# Patient Record
Sex: FEMALE | Race: WHITE | Employment: UNEMPLOYED | URBAN - NONMETROPOLITAN AREA
[De-identification: names, ages, dates, MRNs, and addresses within clinical notes are randomized per-mention and may not be internally consistent; named-entity substitution may affect disease eponyms.]

---

## 2019-11-14 ENCOUNTER — TELEPHONE (OUTPATIENT)
Dept: PRIMARY CARE CLINIC | Age: 59
End: 2019-11-14

## 2019-11-14 ENCOUNTER — OFFICE VISIT (OUTPATIENT)
Dept: PRIMARY CARE CLINIC | Age: 59
End: 2019-11-14
Payer: COMMERCIAL

## 2019-11-14 VITALS
DIASTOLIC BLOOD PRESSURE: 90 MMHG | HEIGHT: 67 IN | BODY MASS INDEX: 27.62 KG/M2 | TEMPERATURE: 98 F | RESPIRATION RATE: 18 BRPM | WEIGHT: 176 LBS | SYSTOLIC BLOOD PRESSURE: 154 MMHG | OXYGEN SATURATION: 97 % | HEART RATE: 97 BPM

## 2019-11-14 DIAGNOSIS — E11.65 TYPE 2 DIABETES MELLITUS WITH HYPERGLYCEMIA, WITHOUT LONG-TERM CURRENT USE OF INSULIN (HCC): ICD-10-CM

## 2019-11-14 DIAGNOSIS — E78.2 MIXED HYPERLIPIDEMIA: ICD-10-CM

## 2019-11-14 DIAGNOSIS — H10.31 ACUTE CONJUNCTIVITIS OF RIGHT EYE, UNSPECIFIED ACUTE CONJUNCTIVITIS TYPE: ICD-10-CM

## 2019-11-14 DIAGNOSIS — I10 ESSENTIAL HYPERTENSION: ICD-10-CM

## 2019-11-14 DIAGNOSIS — E03.9 ACQUIRED HYPOTHYROIDISM: ICD-10-CM

## 2019-11-14 PROCEDURE — 99213 OFFICE O/P EST LOW 20 MIN: CPT | Performed by: INTERNAL MEDICINE

## 2019-11-14 RX ORDER — PRAVASTATIN SODIUM 10 MG
10 TABLET ORAL DAILY
COMMUNITY
End: 2019-11-14 | Stop reason: SDUPTHER

## 2019-11-14 RX ORDER — LEVOTHYROXINE AND LIOTHYRONINE 57; 13.5 UG/1; UG/1
90 TABLET ORAL DAILY
COMMUNITY
End: 2019-11-14 | Stop reason: SDUPTHER

## 2019-11-14 RX ORDER — TELMISARTAN 80 MG/1
80 TABLET ORAL DAILY
Qty: 30 TABLET | Refills: 11 | Status: SHIPPED | OUTPATIENT
Start: 2019-11-14 | End: 2021-04-13 | Stop reason: SDUPTHER

## 2019-11-14 RX ORDER — PRAVASTATIN SODIUM 10 MG
10 TABLET ORAL DAILY
Qty: 30 TABLET | Refills: 11 | Status: SHIPPED | OUTPATIENT
Start: 2019-11-14 | End: 2020-01-06

## 2019-11-14 RX ORDER — METFORMIN HYDROCHLORIDE 500 MG/1
1000 TABLET, EXTENDED RELEASE ORAL 2 TIMES DAILY
COMMUNITY
End: 2019-11-14 | Stop reason: SDUPTHER

## 2019-11-14 RX ORDER — GLIMEPIRIDE 2 MG/1
2 TABLET ORAL
COMMUNITY
End: 2019-11-14 | Stop reason: SDUPTHER

## 2019-11-14 RX ORDER — TRIAMTERENE AND HYDROCHLOROTHIAZIDE 75; 50 MG/1; MG/1
1 TABLET ORAL DAILY
Qty: 30 TABLET | Refills: 11 | Status: SHIPPED | OUTPATIENT
Start: 2019-11-14 | End: 2020-11-11 | Stop reason: SDUPTHER

## 2019-11-14 RX ORDER — TRIAMTERENE AND HYDROCHLOROTHIAZIDE 75; 50 MG/1; MG/1
1 TABLET ORAL DAILY
COMMUNITY
End: 2019-11-14 | Stop reason: SDUPTHER

## 2019-11-14 RX ORDER — METOPROLOL SUCCINATE 100 MG/1
100 TABLET, EXTENDED RELEASE ORAL DAILY
Qty: 30 TABLET | Refills: 11 | Status: SHIPPED | OUTPATIENT
Start: 2019-11-14 | End: 2020-11-11 | Stop reason: SDUPTHER

## 2019-11-14 RX ORDER — LEVOTHYROXINE AND LIOTHYRONINE 57; 13.5 UG/1; UG/1
90 TABLET ORAL DAILY
Qty: 30 TABLET | Refills: 11 | Status: SHIPPED | OUTPATIENT
Start: 2019-11-14 | End: 2020-11-11 | Stop reason: SDUPTHER

## 2019-11-14 RX ORDER — TELMISARTAN 80 MG/1
80 TABLET ORAL DAILY
COMMUNITY
End: 2019-11-14 | Stop reason: SDUPTHER

## 2019-11-14 RX ORDER — ERYTHROMYCIN 5 MG/G
OINTMENT OPHTHALMIC
Qty: 1 TUBE | Refills: 0 | Status: SHIPPED | OUTPATIENT
Start: 2019-11-14 | End: 2019-11-24

## 2019-11-14 RX ORDER — METOPROLOL SUCCINATE 100 MG/1
100 TABLET, EXTENDED RELEASE ORAL DAILY
COMMUNITY
End: 2019-11-14 | Stop reason: SDUPTHER

## 2019-11-14 RX ORDER — METFORMIN HYDROCHLORIDE 500 MG/1
1000 TABLET, EXTENDED RELEASE ORAL 2 TIMES DAILY
Qty: 120 TABLET | Refills: 11 | Status: SHIPPED | OUTPATIENT
Start: 2019-11-14 | End: 2020-11-11 | Stop reason: SDUPTHER

## 2019-11-14 RX ORDER — GLIMEPIRIDE 2 MG/1
TABLET ORAL
Qty: 120 TABLET | Refills: 11 | Status: SHIPPED | OUTPATIENT
Start: 2019-11-14 | End: 2020-11-11 | Stop reason: SDUPTHER

## 2019-11-14 SDOH — HEALTH STABILITY: MENTAL HEALTH: HOW OFTEN DO YOU HAVE A DRINK CONTAINING ALCOHOL?: NEVER

## 2019-11-14 ASSESSMENT — ENCOUNTER SYMPTOMS
PHOTOPHOBIA: 0
SORE THROAT: 0
EYE ITCHING: 0
CONSTIPATION: 0
ABDOMINAL PAIN: 0
NAUSEA: 0
TROUBLE SWALLOWING: 0
WHEEZING: 0
EYE PAIN: 1
EYE DISCHARGE: 0
DIARRHEA: 0
ANAL BLEEDING: 0
SHORTNESS OF BREATH: 0
RHINORRHEA: 0
COUGH: 0
VOMITING: 0
STRIDOR: 0
COLOR CHANGE: 0
BLOOD IN STOOL: 0
FACIAL SWELLING: 0

## 2019-11-14 ASSESSMENT — VISUAL ACUITY
OS_CC: <20/200
OD_CC: 20/40

## 2019-12-03 ENCOUNTER — HOSPITAL ENCOUNTER (OUTPATIENT)
Age: 59
Discharge: HOME | End: 2019-12-05
Payer: COMMERCIAL

## 2019-12-03 DIAGNOSIS — E78.2 MIXED HYPERLIPIDEMIA: ICD-10-CM

## 2019-12-03 DIAGNOSIS — E03.9 ACQUIRED HYPOTHYROIDISM: ICD-10-CM

## 2019-12-03 DIAGNOSIS — I10 ESSENTIAL HYPERTENSION: ICD-10-CM

## 2019-12-03 DIAGNOSIS — E11.65 TYPE 2 DIABETES MELLITUS WITH HYPERGLYCEMIA, WITHOUT LONG-TERM CURRENT USE OF INSULIN (HCC): ICD-10-CM

## 2019-12-03 LAB
ALBUMIN SERPL-MCNC: 4.3 G/DL (ref 3.5–5.2)
ALP BLD-CCNC: 121 U/L (ref 35–104)
ALT SERPL-CCNC: 21 U/L (ref 0–32)
ANION GAP SERPL CALCULATED.3IONS-SCNC: 21 MMOL/L (ref 7–16)
AST SERPL-CCNC: 18 U/L (ref 0–31)
BILIRUB SERPL-MCNC: 0.6 MG/DL (ref 0–1.2)
BUN BLDV-MCNC: 17 MG/DL (ref 8–23)
CALCIUM SERPL-MCNC: 9.5 MG/DL (ref 8.6–10.2)
CHLORIDE BLD-SCNC: 98 MMOL/L (ref 98–107)
CHOLESTEROL, TOTAL: 183 MG/DL (ref 0–199)
CO2: 19 MMOL/L (ref 22–29)
CREAT SERPL-MCNC: 0.9 MG/DL (ref 0.5–1)
CREATININE URINE: 83 MG/DL (ref 29–226)
GFR AFRICAN AMERICAN: >60
GFR NON-AFRICAN AMERICAN: >60 ML/MIN/1.73
GLUCOSE BLD-MCNC: 399 MG/DL (ref 74–99)
HBA1C MFR BLD: 12.9 % (ref 4–5.6)
HDLC SERPL-MCNC: 47 MG/DL
LDL CHOLESTEROL CALCULATED: 102 MG/DL (ref 0–99)
MICROALBUMIN UR-MCNC: 121 MG/L
MICROALBUMIN/CREAT UR-RTO: 145.8 (ref 0–30)
POTASSIUM SERPL-SCNC: 4.3 MMOL/L (ref 3.5–5)
SODIUM BLD-SCNC: 138 MMOL/L (ref 132–146)
T4 FREE: 1.26 NG/DL (ref 0.93–1.7)
TOTAL PROTEIN: 7.4 G/DL (ref 6.4–8.3)
TRIGL SERPL-MCNC: 172 MG/DL (ref 0–149)
TSH SERPL DL<=0.05 MIU/L-ACNC: 1.29 UIU/ML (ref 0.27–4.2)
VLDLC SERPL CALC-MCNC: 34 MG/DL

## 2019-12-03 PROCEDURE — 80053 COMPREHEN METABOLIC PANEL: CPT

## 2019-12-03 PROCEDURE — 83036 HEMOGLOBIN GLYCOSYLATED A1C: CPT

## 2019-12-03 PROCEDURE — 84443 ASSAY THYROID STIM HORMONE: CPT

## 2019-12-03 PROCEDURE — 80061 LIPID PANEL: CPT

## 2019-12-03 PROCEDURE — 82570 ASSAY OF URINE CREATININE: CPT

## 2019-12-03 PROCEDURE — 84439 ASSAY OF FREE THYROXINE: CPT

## 2019-12-03 PROCEDURE — 36415 COLL VENOUS BLD VENIPUNCTURE: CPT

## 2019-12-03 PROCEDURE — 82044 UR ALBUMIN SEMIQUANTITATIVE: CPT

## 2020-01-06 ENCOUNTER — OFFICE VISIT (OUTPATIENT)
Dept: PRIMARY CARE CLINIC | Age: 60
End: 2020-01-06
Payer: COMMERCIAL

## 2020-01-06 ENCOUNTER — TELEPHONE (OUTPATIENT)
Dept: ADMINISTRATIVE | Age: 60
End: 2020-01-06

## 2020-01-06 VITALS
DIASTOLIC BLOOD PRESSURE: 78 MMHG | HEIGHT: 67 IN | TEMPERATURE: 98.2 F | WEIGHT: 169 LBS | OXYGEN SATURATION: 99 % | SYSTOLIC BLOOD PRESSURE: 124 MMHG | BODY MASS INDEX: 26.53 KG/M2 | HEART RATE: 83 BPM | RESPIRATION RATE: 16 BRPM

## 2020-01-06 PROBLEM — H25.89 OTHER AGE-RELATED CATARACT: Status: ACTIVE | Noted: 2020-01-06

## 2020-01-06 PROBLEM — Z01.818 ENCOUNTER FOR PRE-OPERATIVE EXAMINATION: Status: ACTIVE | Noted: 2020-01-06

## 2020-01-06 PROCEDURE — 99214 OFFICE O/P EST MOD 30 MIN: CPT | Performed by: INTERNAL MEDICINE

## 2020-01-06 RX ORDER — KETOROLAC TROMETHAMINE 5 MG/ML
SOLUTION OPHTHALMIC
COMMUNITY
Start: 2019-12-11 | End: 2020-11-12 | Stop reason: ALTCHOICE

## 2020-01-06 RX ORDER — PRAVASTATIN SODIUM 40 MG
40 TABLET ORAL DAILY
Qty: 90 TABLET | Refills: 1 | Status: SHIPPED
Start: 2020-01-06 | End: 2020-11-11 | Stop reason: SDUPTHER

## 2020-01-06 RX ORDER — BESIFLOXACIN 6 MG/ML
SUSPENSION OPHTHALMIC
COMMUNITY
Start: 2019-12-11 | End: 2022-07-18 | Stop reason: ALTCHOICE

## 2020-01-06 RX ORDER — PREDNISOLONE ACETATE 10 MG/ML
SUSPENSION/ DROPS OPHTHALMIC
COMMUNITY
Start: 2019-12-11 | End: 2020-11-12 | Stop reason: ALTCHOICE

## 2020-01-06 RX ORDER — IBUPROFEN 800 MG/1
TABLET ORAL
COMMUNITY
Start: 2019-12-02

## 2020-01-06 ASSESSMENT — ENCOUNTER SYMPTOMS
FACIAL SWELLING: 0
STRIDOR: 0
WHEEZING: 0
BLOOD IN STOOL: 0
COUGH: 0
DIARRHEA: 0
RHINORRHEA: 0
ANAL BLEEDING: 0
PHOTOPHOBIA: 0
NAUSEA: 0
ABDOMINAL PAIN: 0
SORE THROAT: 0
TROUBLE SWALLOWING: 0
EYE PAIN: 0
COLOR CHANGE: 0
EYE ITCHING: 0
VOMITING: 0
CONSTIPATION: 0
SHORTNESS OF BREATH: 0
EYE DISCHARGE: 0

## 2020-01-06 NOTE — ASSESSMENT & PLAN NOTE
Blood pressures are stable. Continue medications and monitor blood pressures at home. Call office if systolics are over 704 over diastolics over 90.

## 2020-01-06 NOTE — ASSESSMENT & PLAN NOTE
Increase pravastatin to 40 mg at bedtime in view of increased cardiovascular risk.   Will recheck lipids on next office visit

## 2020-01-06 NOTE — PROGRESS NOTES
2020    Name: Ayden Felder : 1960 Sex: female  Age: 62 y.o. Subjective:  Chief Complaint   Patient presents with    Pre-op Exam     Patient is having cataract removal by Dr Sebastián Escobar on 1/10/20. KWABENA Puentes is here for preoperative clearance for cataract surgery on her right eye. Initially thought she had conjunctivitis and was sent to Dr. Sebastián Escobar who found her to have a fast-growing cataract on the right. He put her on eyedrops. She is scheduled for surgery in January 10, 2020. Karol Puentes has a history of poorly controlled type 2 diabetes mellitus not on insulin. Her last hemoglobin A1c was 12.9%. I found out she was not taking her Bydureon shots. She promises to get them and take them properly. In the meantime she stays on her metformin thousand milligrams twice daily and Amaryl 2 mg twice daily. She is very reluctant to take insulin as she has a sister who is taking insulin. She promises to watch her blood sugars more carefully. Last time she checked it today it was over 200. She is also losing weight. This could be secondary to her uncontrolled blood sugars. She has lost over 100 pounds in the last year or 2. She has a history of hypothyroidism on levothyroxine. TSH and free T4 within normal limits in 2019    Her total cholesterol was not bad however LDL cholesterol slightly elevated at 102. Triglycerides slightly elevated at 177. Hemoglobin A1c is noted was 12.9%. Fasting blood sugar was 399. Alkaline phosphatase was 121. Colonoscopy was scheduled last year however the endoscopist was ill so she has not made an appointment for follow-up. I recommend that she call his office and reschedule her colonoscopy. She refuses flu shot. She has had a Pneumovax. Discussed her increased risk for cardiovascular events in view of her morbidities. She is on low-dose pravastatin 10 mg, will increase it as recommended to 40 mg at bedtime.     She has not had a Pap test in (ACULAR) 0.5 % ophthalmic solution, PLACE 1 DROP IN OPERATIVE EYE 4 TIMES DAILY BEGINNING 2 DAYS PRIOR TO SURGERY, Disp: , Rfl:     prednisoLONE acetate (PRED FORTE) 1 % ophthalmic suspension, PLACE 1 DROP IN OPERATIVE EYE 4 TIMES A DAY START AFTER SURGERY, Disp: , Rfl:     ibuprofen (ADVIL;MOTRIN) 800 MG tablet, TAKE ONE TABLET BY MOUTH EVERY 4 HOURS AS NEEDED, Disp: , Rfl:     pravastatin (PRAVACHOL) 40 MG tablet, Take 1 tablet by mouth daily, Disp: 90 tablet, Rfl: 1    exenatide (BYDUREON) 2 MG SRER injection, Inject 1 Syringe into the skin once a week, Disp: 4 each, Rfl: 5    metoprolol succinate (TOPROL XL) 100 MG extended release tablet, Take 1 tablet by mouth daily, Disp: 30 tablet, Rfl: 11    metFORMIN (GLUCOPHAGE-XR) 500 MG extended release tablet, Take 2 tablets by mouth 2 times daily, Disp: 120 tablet, Rfl: 11    blood glucose test strips (ASCENSIA AUTODISC VI;ONE TOUCH ULTRA TEST VI) strip, 1 each by In Vitro route 2 times daily One touch ultra blue., Disp: 200 each, Rfl: 5    glimepiride (AMARYL) 2 MG tablet, 1 tab in AM, 3 tabs in PM, Disp: 120 tablet, Rfl: 11    triamterene-hydrochlorothiazide (MAXZIDE) 75-50 MG per tablet, Take 1 tablet by mouth daily, Disp: 30 tablet, Rfl: 11    telmisartan (MICARDIS) 80 MG tablet, Take 1 tablet by mouth daily, Disp: 30 tablet, Rfl: 11    thyroid (ARMOUR THYROID) 90 MG tablet, Take 1 tablet by mouth daily, Disp: 30 tablet, Rfl: 11     No Known Allergies     Past Medical History:   Diagnosis Date    Acquired hypothyroidism 11/14/2019    Essential hypertension 11/14/2019    Mixed hyperlipidemia 11/14/2019    Type 2 diabetes mellitus with hyperglycemia, without long-term current use of insulin [facility] 11/14/2019       Health Maintenance Due   Topic Date Due    Hepatitis C screen  1960    Diabetic foot exam  05/02/1968    HIV screen  05/02/1973    Cervical cancer screen  05/02/1979    Shingles Vaccine (1 of 2) 05/02/2008    Colon cancer screen Pulmonary:      Effort: Pulmonary effort is normal. No respiratory distress. Breath sounds: Normal breath sounds. No wheezing or rales. Chest:      Chest wall: No tenderness. Abdominal:      General: Bowel sounds are normal. There is no distension. Palpations: Abdomen is soft. There is no mass. Tenderness: There is no tenderness. There is no guarding or rebound. Musculoskeletal: Normal range of motion. General: No tenderness or deformity. Lymphadenopathy:      Cervical: No cervical adenopathy. Skin:     General: Skin is warm and dry. Coloration: Skin is not pale. Findings: No erythema or rash. Neurological:      General: No focal deficit present. Mental Status: She is alert and oriented to person, place, and time. Cranial Nerves: No cranial nerve deficit. Sensory: No sensory deficit. Deep Tendon Reflexes: Reflexes normal.   Psychiatric:         Mood and Affect: Mood normal.         Behavior: Behavior normal.         Thought Content: Thought content normal.         Judgment: Judgment normal.          Last labs reviewed. ASSESSMENT & PLAN :   Problem List        Circulatory    Essential hypertension     Blood pressures are stable. Continue medications and monitor blood pressures at home. Call office if systolics are over 537 over diastolics over 90. Relevant Medications    metoprolol succinate (TOPROL XL) 100 MG extended release tablet    triamterene-hydrochlorothiazide (MAXZIDE) 75-50 MG per tablet    telmisartan (MICARDIS) 80 MG tablet    pravastatin (PRAVACHOL) 40 MG tablet       Endocrine    Type 2 diabetes mellitus with hyperglycemia, without long-term current use of insulin ([facility])     Blood pressure is very poorly controlled. She promises to take her Bydureon shots weekly on a regular basis and continue her other medications. If her hemoglobin A1c has not improved on her next office visit I would strongly recommend adding insulin.

## 2020-01-06 NOTE — ASSESSMENT & PLAN NOTE
Blood pressure is very poorly controlled. She promises to take her Bydureon shots weekly on a regular basis and continue her other medications. If her hemoglobin A1c has not improved on her next office visit I would strongly recommend adding insulin. She saw Dr. Torsten Cobb and will not  go back to see him. Dr. Angie Vasquez is not on her insurance list.  I asked her to check her insurance list for other endocrinologist in the area. She was also asked to watch more closely the carbs and sweets in her diet.

## 2020-06-01 ENCOUNTER — TELEPHONE (OUTPATIENT)
Dept: PRIMARY CARE CLINIC | Age: 60
End: 2020-06-01

## 2020-06-03 ENCOUNTER — TELEPHONE (OUTPATIENT)
Dept: PRIMARY CARE CLINIC | Age: 60
End: 2020-06-03

## 2020-11-11 ENCOUNTER — VIRTUAL VISIT (OUTPATIENT)
Dept: PRIMARY CARE CLINIC | Age: 60
End: 2020-11-11
Payer: COMMERCIAL

## 2020-11-11 PROCEDURE — 99443 PR PHYS/QHP TELEPHONE EVALUATION 21-30 MIN: CPT | Performed by: INTERNAL MEDICINE

## 2020-11-11 RX ORDER — GLIMEPIRIDE 2 MG/1
TABLET ORAL
Qty: 120 TABLET | Refills: 11 | Status: SHIPPED
Start: 2020-11-11 | End: 2021-12-13 | Stop reason: SDUPTHER

## 2020-11-11 RX ORDER — METFORMIN HYDROCHLORIDE 500 MG/1
1000 TABLET, EXTENDED RELEASE ORAL 2 TIMES DAILY
Qty: 120 TABLET | Refills: 11 | Status: SHIPPED
Start: 2020-11-11 | End: 2020-11-12 | Stop reason: ALTCHOICE

## 2020-11-11 RX ORDER — PRAVASTATIN SODIUM 40 MG
40 TABLET ORAL DAILY
Qty: 90 TABLET | Refills: 1 | Status: SHIPPED
Start: 2020-11-11 | End: 2020-11-12

## 2020-11-11 RX ORDER — METOPROLOL SUCCINATE 100 MG/1
100 TABLET, EXTENDED RELEASE ORAL DAILY
Qty: 30 TABLET | Refills: 11 | Status: SHIPPED
Start: 2020-11-11 | End: 2021-12-13 | Stop reason: SDUPTHER

## 2020-11-11 RX ORDER — LEVOTHYROXINE AND LIOTHYRONINE 57; 13.5 UG/1; UG/1
90 TABLET ORAL DAILY
Qty: 30 TABLET | Refills: 11 | Status: SHIPPED
Start: 2020-11-11 | End: 2021-12-13 | Stop reason: SDUPTHER

## 2020-11-11 RX ORDER — TRIAMTERENE AND HYDROCHLOROTHIAZIDE 75; 50 MG/1; MG/1
1 TABLET ORAL DAILY
Qty: 30 TABLET | Refills: 11 | Status: SHIPPED
Start: 2020-11-11 | End: 2022-01-03 | Stop reason: SDUPTHER

## 2020-11-11 ASSESSMENT — PATIENT HEALTH QUESTIONNAIRE - PHQ9
SUM OF ALL RESPONSES TO PHQ QUESTIONS 1-9: 0
2. FEELING DOWN, DEPRESSED OR HOPELESS: 0
SUM OF ALL RESPONSES TO PHQ QUESTIONS 1-9: 0
SUM OF ALL RESPONSES TO PHQ QUESTIONS 1-9: 0
1. LITTLE INTEREST OR PLEASURE IN DOING THINGS: 0
SUM OF ALL RESPONSES TO PHQ9 QUESTIONS 1 & 2: 0

## 2020-11-12 PROBLEM — H25.89 OTHER AGE-RELATED CATARACT: Status: RESOLVED | Noted: 2020-01-06 | Resolved: 2020-11-12

## 2020-11-12 PROBLEM — H10.31 ACUTE CONJUNCTIVITIS OF RIGHT EYE: Status: RESOLVED | Noted: 2019-11-14 | Resolved: 2020-11-12

## 2020-11-12 PROBLEM — E78.2 MIXED HYPERLIPIDEMIA: Status: RESOLVED | Noted: 2019-11-14 | Resolved: 2020-11-12

## 2020-11-12 PROBLEM — Z01.818 ENCOUNTER FOR PRE-OPERATIVE EXAMINATION: Status: RESOLVED | Noted: 2020-01-06 | Resolved: 2020-11-12

## 2020-11-12 RX ORDER — METFORMIN HYDROCHLORIDE EXTENDED-RELEASE TABLETS 1000 MG/1
TABLET, FILM COATED, EXTENDED RELEASE ORAL
COMMUNITY
Start: 2020-01-08 | End: 2020-11-12

## 2020-11-12 RX ORDER — PRAVASTATIN SODIUM 40 MG
20 TABLET ORAL DAILY
Qty: 45 TABLET | Refills: 1 | Status: SHIPPED
Start: 2020-11-12 | End: 2020-11-12 | Stop reason: ALTCHOICE

## 2020-11-12 RX ORDER — PRAVASTATIN SODIUM 40 MG
20 TABLET ORAL DAILY
Qty: 45 TABLET | Refills: 1 | Status: SHIPPED
Start: 2020-11-12 | End: 2021-04-13 | Stop reason: SDUPTHER

## 2020-11-12 ASSESSMENT — ENCOUNTER SYMPTOMS
COLOR CHANGE: 0
EYE PAIN: 0
WHEEZING: 0
ANAL BLEEDING: 0
DIARRHEA: 0
VOMITING: 0
STRIDOR: 0
RHINORRHEA: 0
CONSTIPATION: 0
SHORTNESS OF BREATH: 0
COUGH: 0
TROUBLE SWALLOWING: 0
SORE THROAT: 0
EYE ITCHING: 0
NAUSEA: 0
ABDOMINAL PAIN: 0
PHOTOPHOBIA: 0
EYE DISCHARGE: 0
FACIAL SWELLING: 0
BLOOD IN STOOL: 0

## 2020-11-12 NOTE — PROGRESS NOTES
Trula Sandifer is a 56 y.o. female evaluated via telephone on 2020. Consent:  She and/or health care decision maker is aware that that she may receive a bill for this telephone service, depending on her insurance coverage, and has provided verbal consent to proceed: Yes      Documentation:  I communicated with the patient and/or health care decision maker about diabetes. Details of this discussion including any medical advice provided: see note below    Patient was located at her [facility] home address, PCP located at other [facility] address. No one else was involved in this call      I affirm this is a Patient Initiated Episode with a Patient who has not had a related appointment within my department in the past 7 days or scheduled within the next 24 hours. Patient identification was verified at the start of the visit: Yes    Total Time: minutes: 21-30 minutes    Note: not billable if this call serves to triage the patient into an appointment for the relevant concern      Rebecca Stark     2020    Name: Trula Sandifer : 1960 Sex: female  Age: 56 y.o. Subjective:  Chief Complaint   Patient presents with    Medication Refill        HPI       Chavo Washington has a history of poorly controlled type 2 diabetes mellitus not on insulin. Her last hemoglobin A1c was 12.9%. I found out she was not taking her Bydureon shots. She promises to get them and take them properly. In the meantime she stays on her metformin thousand milligrams twice daily and Amaryl 2 mg twice daily. She is very reluctant to take insulin as she has a sister who is taking insulin. She promises to watch her blood sugars more carefully. Last time she checked it today it was over 200. She is also losing weight. This could be secondary to her uncontrolled blood sugars. She has lost over 100 pounds in the last year or 2. Her blood sugars are still markedly elevated.   She used to go to Dr. Gt Dillard as an endocrinologist but her noted was 12.9%. Review of Systems   Constitutional: Negative for appetite change, fatigue and unexpected weight change. HENT: Negative for congestion, ear pain, facial swelling, rhinorrhea, sore throat, tinnitus and trouble swallowing. Eyes: Negative for photophobia, pain, discharge, itching and visual disturbance. Respiratory: Negative for cough, shortness of breath, wheezing and stridor. Cardiovascular: Negative for chest pain, palpitations and leg swelling. Gastrointestinal: Negative for abdominal pain, anal bleeding, blood in stool, constipation, diarrhea, nausea and vomiting. Endocrine: Negative for cold intolerance, heat intolerance, polydipsia, polyphagia and polyuria. Genitourinary: Negative for difficulty urinating, dysuria, flank pain, frequency, hematuria and urgency. Musculoskeletal: Positive for arthralgias. Negative for gait problem, joint swelling and myalgias. Right shoulder   Skin: Negative for color change, pallor and rash. Allergic/Immunologic: Negative for environmental allergies and food allergies. Neurological: Negative for dizziness, tremors, seizures, syncope, speech difficulty, weakness, light-headedness, numbness and headaches. Hematological: Negative for adenopathy. Does not bruise/bleed easily. Psychiatric/Behavioral: Negative for agitation, behavioral problems, confusion, sleep disturbance and suicidal ideas. The patient is not nervous/anxious.            Current Outpatient Medications:     pravastatin (PRAVACHOL) 40 MG tablet, Take 0.5 tablets by mouth daily, Disp: 45 tablet, Rfl: 1    metFORMIN (GLUCOPHAGE) 1000 MG tablet, Take 1 tablet by mouth 2 times daily (with meals), Disp: 180 tablet, Rfl: 1    exenatide (BYDUREON) 2 MG SRER injection, Inject 1 Syringe into the skin once a week, Disp: 4 each, Rfl: 5    glimepiride (AMARYL) 2 MG tablet, 1 tab in AM, 3 tabs in PM, Disp: 120 tablet, Rfl: 11    thyroid (ARMOUR THYROID) 90 MG tablet, Take 1 tablet by mouth daily, Disp: 30 tablet, Rfl: 11    metoprolol succinate (TOPROL XL) 100 MG extended release tablet, Take 1 tablet by mouth daily, Disp: 30 tablet, Rfl: 11    triamterene-hydroCHLOROthiazide (MAXZIDE) 75-50 MG per tablet, Take 1 tablet by mouth daily, Disp: 30 tablet, Rfl: 11    BESIVANCE 0.6 % SUSP, PLACE 1 DROP IN OPERATIVE EYE 3 TIMES DAILY BEGINNING 2 DAYS PRIOR TO SURGERY, Disp: , Rfl:     ibuprofen (ADVIL;MOTRIN) 800 MG tablet, TAKE ONE TABLET BY MOUTH EVERY 4 HOURS AS NEEDED, Disp: , Rfl:     blood glucose test strips (ASCENSIA AUTODISC VI;ONE TOUCH ULTRA TEST VI) strip, 1 each by In Vitro route 2 times daily One touch ultra blue., Disp: 200 each, Rfl: 5    telmisartan (MICARDIS) 80 MG tablet, Take 1 tablet by mouth daily, Disp: 30 tablet, Rfl: 11     No Known Allergies     Past Medical History:   Diagnosis Date    Acquired hypothyroidism 2019    Essential hypertension 2019    Mixed hyperlipidemia 2019    Type 2 diabetes mellitus with hyperglycemia, without long-term current use of insulin ([address] 2019       Health Maintenance Due   Topic Date Due    Hepatitis C screen  1960    Diabetic foot exam  1968    HIV screen  1973    Cervical cancer screen  1979    Shingles Vaccine (1 of 2) 2008    Colon cancer screen colonoscopy  2018    A1C test (Diabetic or Prediabetic)  2020    DTaP/Tdap/Td vaccine (2 - Td) 2020    Diabetic retinal exam  2020        Patient Active Problem List   Diagnosis    Type 2 diabetes mellitus with hyperglycemia, without long-term current use of insulin (HCC)    Essential hypertension    Acquired hypothyroidism        Past Surgical History:   Procedure Laterality Date    APPENDECTOMY       SECTION      X3    CHOLECYSTECTOMY      COCCYX REMOVAL      WISDOM TOOTH EXTRACTION          Family History   Problem Relation Age of Onset    Heart Disease Father         Social History     Tobacco Use    Smoking status: Never Smoker    Smokeless tobacco: Never Used   Substance Use Topics    Alcohol use: Never     Frequency: Never    Drug use: Never        Objective  There were no vitals filed for this visit. Exam:       Last labs reviewed. ASSESSMENT & PLAN :   Problem List        Circulatory    Essential hypertension     Blood pressures are stable. Continue medications and monitor blood pressures at home. Call office if systolics are over 763 over diastolics over 90. Relevant Medications    telmisartan (MICARDIS) 80 MG tablet    metoprolol succinate (TOPROL XL) 100 MG extended release tablet    triamterene-hydroCHLOROthiazide (MAXZIDE) 75-50 MG per tablet    Other Relevant Orders    Comprehensive Metabolic Panel       Endocrine    Type 2 diabetes mellitus with hyperglycemia, without long-term current use of insulin ([address] - Primary     Again had a long discussion about the need for her to watch her diet and monitor blood sugars and let me know very high so we can adjust medications. She is very reluctant to go on insulin. She is already on Bydureon, Metformin and glimepiride. We can add Jardiance if her hemoglobin A1c is still extremely high. I would really like her to follow-up with endocrinology but she does not have one in the area that she wants to go to. Relevant Medications    blood glucose test strips (ASCENSIA AUTODISC VI;ONE TOUCH ULTRA TEST VI) strip    exenatide (BYDUREON) 2 MG SRER injection    glimepiride (AMARYL) 2 MG tablet    metFORMIN (GLUCOPHAGE) 1000 MG tablet    Other Relevant Orders    Comprehensive Metabolic Panel    Hemoglobin A1C    Microalbumin / Creatinine Urine Ratio    Acquired hypothyroidism     Continue levothyroxine.   Check TSH and free T4         Relevant Medications    thyroid (ARMOUR THYROID) 90 MG tablet    Other Relevant Orders    T4, Free    TSH without Reflex       Other    RESOLVED: Mixed hyperlipidemia    Relevant Medications    telmisartan (MICARDIS) 80 MG tablet    metoprolol succinate (TOPROL XL) 100 MG extended release tablet    triamterene-hydroCHLOROthiazide (MAXZIDE) 75-50 MG per tablet    pravastatin (PRAVACHOL) 40 MG tablet    Other Relevant Orders    Lipid Panel           Return in about 3 months (around 2/11/2021), or Diabetes mellitus and hypertension.        Bob Saeed DO  11/12/2020

## 2020-11-12 NOTE — ASSESSMENT & PLAN NOTE
Again had a long discussion about the need for her to watch her diet and monitor blood sugars and let me know very high so we can adjust medications. She is very reluctant to go on insulin. She is already on Bydureon, Metformin and glimepiride. We can add Jardiance if her hemoglobin A1c is still extremely high. I would really like her to follow-up with endocrinology but she does not have one in the area that she wants to go to.

## 2020-11-12 NOTE — ASSESSMENT & PLAN NOTE
Blood pressures are stable. Continue medications and monitor blood pressures at home. Call office if systolics are over 299 over diastolics over 90.

## 2021-04-08 ENCOUNTER — TELEPHONE (OUTPATIENT)
Dept: PRIMARY CARE CLINIC | Age: 61
End: 2021-04-08

## 2021-04-08 RX ORDER — EXENATIDE 2 MG/.65ML
2 INJECTION, SUSPENSION, EXTENDED RELEASE SUBCUTANEOUS WEEKLY
Qty: 4 PEN | Refills: 0 | Status: SHIPPED
Start: 2021-04-08 | End: 2021-04-13 | Stop reason: SDUPTHER

## 2021-04-08 NOTE — TELEPHONE ENCOUNTER
Needs new script for a new pen called B cise the other is no longer made please send to [facility] in [place] thank you

## 2021-04-13 ENCOUNTER — OFFICE VISIT (OUTPATIENT)
Dept: PRIMARY CARE CLINIC | Age: 61
End: 2021-04-13
Payer: COMMERCIAL

## 2021-04-13 VITALS
WEIGHT: 192 LBS | HEART RATE: 78 BPM | HEIGHT: 66 IN | DIASTOLIC BLOOD PRESSURE: 70 MMHG | OXYGEN SATURATION: 97 % | BODY MASS INDEX: 30.86 KG/M2 | TEMPERATURE: 97.2 F | SYSTOLIC BLOOD PRESSURE: 120 MMHG

## 2021-04-13 DIAGNOSIS — E03.9 ACQUIRED HYPOTHYROIDISM: ICD-10-CM

## 2021-04-13 DIAGNOSIS — R10.11 RIGHT UPPER QUADRANT ABDOMINAL PAIN: ICD-10-CM

## 2021-04-13 DIAGNOSIS — E04.2 MULTINODULAR GOITER: ICD-10-CM

## 2021-04-13 DIAGNOSIS — I10 ESSENTIAL HYPERTENSION: ICD-10-CM

## 2021-04-13 DIAGNOSIS — E78.2 MIXED HYPERLIPIDEMIA: ICD-10-CM

## 2021-04-13 DIAGNOSIS — Z12.31 ENCOUNTER FOR SCREENING MAMMOGRAM FOR MALIGNANT NEOPLASM OF BREAST: ICD-10-CM

## 2021-04-13 DIAGNOSIS — E11.65 TYPE 2 DIABETES MELLITUS WITH HYPERGLYCEMIA, WITHOUT LONG-TERM CURRENT USE OF INSULIN (HCC): Primary | ICD-10-CM

## 2021-04-13 PROBLEM — E04.9 GOITER: Status: ACTIVE | Noted: 2021-04-13

## 2021-04-13 PROCEDURE — 99214 OFFICE O/P EST MOD 30 MIN: CPT | Performed by: INTERNAL MEDICINE

## 2021-04-13 RX ORDER — PRAVASTATIN SODIUM 40 MG
20 TABLET ORAL DAILY
Qty: 90 TABLET | Refills: 0 | Status: SHIPPED
Start: 2021-04-13 | End: 2021-08-10

## 2021-04-13 RX ORDER — TELMISARTAN 80 MG/1
80 TABLET ORAL DAILY
Qty: 30 TABLET | Refills: 11 | Status: SHIPPED
Start: 2021-04-13 | End: 2022-01-03 | Stop reason: SDUPTHER

## 2021-04-13 RX ORDER — EXENATIDE 2 MG/.65ML
2 INJECTION, SUSPENSION, EXTENDED RELEASE SUBCUTANEOUS WEEKLY
Qty: 4 PEN | Refills: 0 | Status: SHIPPED
Start: 2021-04-13 | End: 2021-07-06

## 2021-04-13 ASSESSMENT — PATIENT HEALTH QUESTIONNAIRE - PHQ9
SUM OF ALL RESPONSES TO PHQ QUESTIONS 1-9: 0
SUM OF ALL RESPONSES TO PHQ QUESTIONS 1-9: 0
2. FEELING DOWN, DEPRESSED OR HOPELESS: 0
1. LITTLE INTEREST OR PLEASURE IN DOING THINGS: 0

## 2021-04-13 ASSESSMENT — ENCOUNTER SYMPTOMS
EYE PAIN: 0
FACIAL SWELLING: 0
COLOR CHANGE: 0
DIARRHEA: 0
NAUSEA: 0
BLOOD IN STOOL: 0
SORE THROAT: 0
COUGH: 0
STRIDOR: 0
PHOTOPHOBIA: 0
EYE DISCHARGE: 0
EYE ITCHING: 0
TROUBLE SWALLOWING: 0
ABDOMINAL PAIN: 1
SHORTNESS OF BREATH: 0
WHEEZING: 0
ABDOMINAL DISTENTION: 1
ANAL BLEEDING: 0
VOMITING: 0
RHINORRHEA: 0
CONSTIPATION: 0

## 2021-04-13 NOTE — ASSESSMENT & PLAN NOTE
Unclear control, continue current plan pending work up below   Check hemoglobin A1c and urine microalbumin/creatinine ratio. Adjust medications depending upon her hemoglobin A1c and fasting blood sugar.

## 2021-04-13 NOTE — ASSESSMENT & PLAN NOTE
Prior authorization started for [place] Thyroid. She was then 90 mg until I get her TSH and free T4 back. Which time we may need to adjust her dose of [place] Thyroid.

## 2021-04-13 NOTE — PROGRESS NOTES
diarrhea. She had her Covid vaccines in March 2021. Review of Systems   Constitutional: Negative for appetite change, fatigue and unexpected weight change. HENT: Negative for congestion, ear pain, facial swelling, rhinorrhea, sore throat, tinnitus and trouble swallowing. Eyes: Negative for photophobia, pain, discharge, itching and visual disturbance. Respiratory: Negative for cough, shortness of breath, wheezing and stridor. Cardiovascular: Negative for chest pain, palpitations and leg swelling. Gastrointestinal: Positive for abdominal distention and abdominal pain. Negative for anal bleeding, blood in stool, constipation, diarrhea, nausea and vomiting. Endocrine: Negative for cold intolerance, heat intolerance, polydipsia, polyphagia and polyuria. Genitourinary: Negative for difficulty urinating, dysuria, flank pain, frequency, hematuria and urgency. Musculoskeletal: Negative for arthralgias, gait problem, joint swelling and myalgias. Skin: Negative for color change, pallor and rash. Allergic/Immunologic: Negative for environmental allergies and food allergies. Neurological: Negative for dizziness, tremors, seizures, syncope, speech difficulty, weakness, light-headedness, numbness and headaches. Hematological: Negative for adenopathy. Does not bruise/bleed easily. Psychiatric/Behavioral: Negative for agitation, behavioral problems, confusion, sleep disturbance and suicidal ideas. The patient is not nervous/anxious.            Current Outpatient Medications:     telmisartan (MICARDIS) 80 MG tablet, Take 1 tablet by mouth daily, Disp: 30 tablet, Rfl: 11    Exenatide (BYDUREON) 2 MG PEN, Inject 1 pen into the skin once a week, Disp: 4 pen, Rfl: 0    pravastatin (PRAVACHOL) 40 MG tablet, Take 0.5 tablets by mouth daily, Disp: 90 tablet, Rfl: 0    metFORMIN (GLUCOPHAGE) 1000 MG tablet, Take 1 tablet by mouth 2 times daily (with meals), Disp: 180 tablet, Rfl: 1    glimepiride (AMARYL) 2 MG tablet, 1 tab in AM, 3 tabs in PM, Disp: 120 tablet, Rfl: 11    thyroid (ARMOUR THYROID) 90 MG tablet, Take 1 tablet by mouth daily, Disp: 30 tablet, Rfl: 11    metoprolol succinate (TOPROL XL) 100 MG extended release tablet, Take 1 tablet by mouth daily, Disp: 30 tablet, Rfl: 11    triamterene-hydroCHLOROthiazide (MAXZIDE) 75-50 MG per tablet, Take 1 tablet by mouth daily, Disp: 30 tablet, Rfl: 11    BESIVANCE 0.6 % SUSP, PLACE 1 DROP IN OPERATIVE EYE 3 TIMES DAILY BEGINNING 2 DAYS PRIOR TO SURGERY, Disp: , Rfl:     ibuprofen (ADVIL;MOTRIN) 800 MG tablet, TAKE ONE TABLET BY MOUTH EVERY 4 HOURS AS NEEDED, Disp: , Rfl:     blood glucose test strips (ASCENSIA AUTODISC VI;ONE TOUCH ULTRA TEST VI) strip, 1 each by In Vitro route 2 times daily One touch ultra blue., Disp: 200 each, Rfl: 5     No Known Allergies     Past Medical History:   Diagnosis Date    Acquired hypothyroidism 11/14/2019    Essential hypertension 11/14/2019    Mixed hyperlipidemia 11/14/2019    Type 2 diabetes mellitus with hyperglycemia, without long-term current use of insulin ([facility] [address] 11/14/2019       Health Maintenance Due   Topic Date Due    Hepatitis C screen  Never done    Diabetic foot exam  Never done    HIV screen  Never done    Cervical cancer screen  Never done    Shingles Vaccine (1 of 2) Never done    Colon cancer screen colonoscopy  11/04/2018    DTaP/Tdap/Td vaccine (2 - Td) 11/09/2020    Diabetic retinal exam  11/14/2020    A1C test (Diabetic or Prediabetic)  12/03/2020    Diabetic microalbuminuria test  12/03/2020    Lipid screen  12/03/2020    TSH testing  12/03/2020    Potassium monitoring  12/03/2020    Creatinine monitoring  12/03/2020        Patient Active Problem List   Diagnosis    Type 2 diabetes mellitus with hyperglycemia, without long-term current use of insulin (HCC)    Essential hypertension    Acquired hypothyroidism    Mixed hyperlipidemia    Right upper quadrant abdominal pain    Goiter  Encounter for screening mammogram for malignant neoplasm of breast    Multinodular goiter        Past Surgical History:   Procedure Laterality Date    APPENDECTOMY       SECTION      X3    CHOLECYSTECTOMY      COCCYX REMOVAL      WISDOM TOOTH EXTRACTION          Family History   Problem Relation Age of Onset    Heart Disease Father         Social History     Tobacco Use    Smoking status: Never Smoker    Smokeless tobacco: Never Used   Substance Use Topics    Alcohol use: Never     Frequency: Never    Drug use: Never        Objective  Vitals:    21 1052   BP: 120/70   Site: Right Upper Arm   Position: Sitting   Cuff Size: Medium Adult   Pulse: 78   Temp: 97.2 °F (36.2 °C)   TempSrc: Temporal   SpO2: 97%   Weight: 192 lb (87.1 kg)   Height: 5' 6\" (1.676 m)        Exam:  Physical Exam  Vitals signs reviewed. Constitutional:       General: She is not in acute distress. Appearance: She is well-developed. She is obese. She is not ill-appearing. HENT:      Head: Normocephalic. Right Ear: External ear normal.      Left Ear: External ear normal.   Eyes:      General: No scleral icterus. Right eye: No discharge. Left eye: No discharge. Conjunctiva/sclera: Conjunctivae normal.      Pupils: Pupils are equal, round, and reactive to light. Neck:      Musculoskeletal: Normal range of motion and neck supple. Thyroid: No thyromegaly. Comments: Thyroid slightly enlarged and nodular  Cardiovascular:      Rate and Rhythm: Normal rate and regular rhythm. Heart sounds: Normal heart sounds. No murmur. No friction rub. No gallop. Pulmonary:      Effort: Pulmonary effort is normal. No respiratory distress. Breath sounds: Normal breath sounds. No wheezing or rales. Chest:      Chest wall: No tenderness. Abdominal:      General: Bowel sounds are normal. There is no distension. Palpations: Abdomen is soft. Tenderness:  There is no guarding or rebound. Comments: 1+ tender to palpation right upper quadrant. I cannot palpate gallbladder. Liver appears slightly enlarged. Musculoskeletal: Normal range of motion. General: No tenderness or deformity. Lymphadenopathy:      Cervical: No cervical adenopathy. Skin:     General: Skin is warm and dry. Coloration: Skin is not pale. Findings: No erythema or rash. Neurological:      Mental Status: She is alert and oriented to person, place, and time. Cranial Nerves: No cranial nerve deficit. Sensory: No sensory deficit. Deep Tendon Reflexes: Reflexes normal.   Psychiatric:         Mood and Affect: Mood normal.         Behavior: Behavior normal.         Thought Content: Thought content normal.         Judgment: Judgment normal.          Last labs reviewed. ASSESSMENT & PLAN :   Problem List        Circulatory    Essential hypertension      Well-controlled, continue current treatment plan ,have fasting CMP done at facility of choice         Relevant Medications    metoprolol succinate (TOPROL XL) 100 MG extended release tablet    triamterene-hydroCHLOROthiazide (MAXZIDE) 75-50 MG per tablet    telmisartan (MICARDIS) 80 MG tablet       Endocrine    Type 2 diabetes mellitus with hyperglycemia, without long-term current use of insulin (HCC) - Primary      Unclear control, continue current plan pending work up below   Check hemoglobin A1c and urine microalbumin/creatinine ratio. Adjust medications depending upon her hemoglobin A1c and fasting blood sugar. Relevant Medications    blood glucose test strips (ASCENSIA AUTODISC VI;ONE TOUCH ULTRA TEST VI) strip    glimepiride (AMARYL) 2 MG tablet    metFORMIN (GLUCOPHAGE) 1000 MG tablet    Exenatide (BYDUREON) 2 MG PEN    Other Relevant Orders    DME Order for Diabetic Testing Supplies as OP    Acquired hypothyroidism     Prior authorization started for [place] Thyroid.   She was then 90 mg until I get her TSH and free T4 back.  Which time we may need to adjust her dose of [place] Thyroid. Relevant Medications    thyroid (ARMOUR THYROID) 90 MG tablet    Multinodular goiter      Ultrasound of thyroid ordered. We will continue to monitor for increase in size of nodules         Relevant Medications    thyroid (ARMOUR THYROID) 90 MG tablet       Other    Mixed hyperlipidemia      Unclear control, continue current plan pending work up below   Check fasting lipid profile and adjust pravastatin dose pending results. Continue watching diet         Relevant Medications    metoprolol succinate (TOPROL XL) 100 MG extended release tablet    triamterene-hydroCHLOROthiazide (MAXZIDE) 75-50 MG per tablet    telmisartan (MICARDIS) 80 MG tablet    pravastatin (PRAVACHOL) 40 MG tablet    Right upper quadrant abdominal pain      This may be from her gallbladder or from her liver. We will check an ultrasound of the gallbladder first and further imaging or decisions will be pending those results. Relevant Orders    US GALLBLADDER RUQ    Encounter for screening mammogram for malignant neoplasm of breast       requisition for screening mammogram given to patient and she will schedule this hersel         Relevant Orders    [facility] DIGITAL SCREEN W OR WO CAD BILATERAL           Return in about 6 months (around 10/13/2021), or hypothyroidism.        Akua Hsu,   4/13/2021

## 2021-04-13 NOTE — ASSESSMENT & PLAN NOTE
This may be from her gallbladder or from her liver. We will check an ultrasound of the gallbladder first and further imaging or decisions will be pending those results.

## 2021-04-13 NOTE — ASSESSMENT & PLAN NOTE
Unclear control, continue current plan pending work up below   Check fasting lipid profile and adjust pravastatin dose pending results.   Continue watching diet

## 2021-04-13 NOTE — PATIENT INSTRUCTIONS
Get US of thyroid and gallbladder and mammogram at [facility]  Get fasting blood work at facility of choice

## 2021-05-13 PROBLEM — Z12.31 ENCOUNTER FOR SCREENING MAMMOGRAM FOR MALIGNANT NEOPLASM OF BREAST: Status: RESOLVED | Noted: 2021-04-13 | Resolved: 2021-05-13

## 2021-07-06 RX ORDER — EXENATIDE 2 MG/.85ML
INJECTION, SUSPENSION, EXTENDED RELEASE SUBCUTANEOUS
Qty: 3.4 ML | Refills: 0 | Status: SHIPPED
Start: 2021-07-06 | End: 2022-04-12 | Stop reason: SDUPTHER

## 2021-07-06 NOTE — TELEPHONE ENCOUNTER
Last Appointment:  4/13/2021  Future Appointments   Date Time Provider Sharon Owens   10/18/2021 10:00 AM Robyn Springer

## 2021-08-06 RX ORDER — EXENATIDE 2 MG/.85ML
INJECTION, SUSPENSION, EXTENDED RELEASE SUBCUTANEOUS
Qty: 3.4 ML | Refills: 0 | Status: SHIPPED
Start: 2021-08-06 | End: 2021-09-08 | Stop reason: SDUPTHER

## 2021-08-10 ENCOUNTER — OFFICE VISIT (OUTPATIENT)
Dept: PRIMARY CARE CLINIC | Age: 61
End: 2021-08-10
Payer: COMMERCIAL

## 2021-08-10 VITALS
SYSTOLIC BLOOD PRESSURE: 136 MMHG | OXYGEN SATURATION: 97 % | DIASTOLIC BLOOD PRESSURE: 72 MMHG | WEIGHT: 200 LBS | HEIGHT: 66 IN | HEART RATE: 78 BPM | TEMPERATURE: 98.1 F | BODY MASS INDEX: 32.14 KG/M2

## 2021-08-10 DIAGNOSIS — Z01.818 ENCOUNTER FOR PRE-OPERATIVE EXAMINATION: Primary | ICD-10-CM

## 2021-08-10 DIAGNOSIS — H25.092 AGE-RELATED INCIPIENT CATARACT OF LEFT EYE: ICD-10-CM

## 2021-08-10 DIAGNOSIS — E11.65 TYPE 2 DIABETES MELLITUS WITH HYPERGLYCEMIA, WITHOUT LONG-TERM CURRENT USE OF INSULIN (HCC): ICD-10-CM

## 2021-08-10 DIAGNOSIS — E03.9 ACQUIRED HYPOTHYROIDISM: ICD-10-CM

## 2021-08-10 DIAGNOSIS — E78.2 MIXED HYPERLIPIDEMIA: ICD-10-CM

## 2021-08-10 DIAGNOSIS — I10 ESSENTIAL HYPERTENSION: ICD-10-CM

## 2021-08-10 DIAGNOSIS — E04.2 MULTINODULAR GOITER: ICD-10-CM

## 2021-08-10 LAB
A/G RATIO: 1.3 RATIO (ref 1.1–2.2)
ALBUMIN SERPL-MCNC: 4.1 G/DL (ref 3.4–4.8)
ALP BLD-CCNC: 78 U/L (ref 42–121)
ALT SERPL-CCNC: 27 U/L (ref 10–54)
ANION GAP SERPL CALCULATED.3IONS-SCNC: 9 MEQ/L (ref 3–11)
AST SERPL-CCNC: 24 U/L (ref 10–41)
BILIRUB SERPL-MCNC: 0.9 MG/DL (ref 0.3–1.5)
BUN BLDV-MCNC: 24 MG/DL (ref 8–21)
CALCIUM SERPL-MCNC: 9 MG/DL (ref 8.5–10.5)
CHLORIDE BLD-SCNC: 101 MEQ/L (ref 98–107)
CHOLESTEROL: 148 MG/DL (ref 140–200)
CO2: 23 MEQ/L (ref 21–31)
CREAT SERPL-MCNC: 1 MG/DL (ref 0.4–1)
CREATININE + EGFR PANEL: 68 ML/MIN
CREATININE URINE: 116.2 MG/DL (ref 22–328)
ESTIMATED AVERAGE GLUCOSE: 214 MG/DL
GFR NON-AFRICAN AMERICAN: 56 ML/MIN
GLOBULIN: 3.2 G/DL (ref 1.9–3.9)
GLUCOSE BLD-MCNC: 264 MG/DL (ref 70–99)
HBA1C MFR BLD: 9.1 % (ref 4–6)
HDLC SERPL-MCNC: 54 MG/DL (ref 35–85)
LDL CHOLESTEROL: 62 MG/DL
LDL/HDL RATIO: 1.1 RATIO
MICROALBUMIN UR-MCNC: 27.7 UG/ML
MICROALBUMIN/CREAT UR-RTO: 23.8 MG/G
POTASSIUM SERPL-SCNC: 4.5 MEQ/L (ref 3.6–5)
SODIUM BLD-SCNC: 133 MEQ/L (ref 135–145)
T4 FREE: 3.52 NG/DL (ref 0.61–1.12)
TOTAL PROTEIN: 7.3 G/DL (ref 5.9–7.8)
TRIGL SERPL-MCNC: 178 MG/DL (ref 41–189)
TSH SERPL DL<=0.05 MIU/L-ACNC: 2.26 UIU/ML (ref 0.34–5.6)

## 2021-08-10 PROCEDURE — 99214 OFFICE O/P EST MOD 30 MIN: CPT | Performed by: INTERNAL MEDICINE

## 2021-08-10 RX ORDER — PRAVASTATIN SODIUM 20 MG
20 TABLET ORAL DAILY
Qty: 30 TABLET | Refills: 5 | Status: SHIPPED
Start: 2021-08-10 | End: 2022-04-12 | Stop reason: SDUPTHER

## 2021-08-10 SDOH — ECONOMIC STABILITY: FOOD INSECURITY: WITHIN THE PAST 12 MONTHS, YOU WORRIED THAT YOUR FOOD WOULD RUN OUT BEFORE YOU GOT MONEY TO BUY MORE.: NEVER TRUE

## 2021-08-10 SDOH — ECONOMIC STABILITY: FOOD INSECURITY: WITHIN THE PAST 12 MONTHS, THE FOOD YOU BOUGHT JUST DIDN'T LAST AND YOU DIDN'T HAVE MONEY TO GET MORE.: NEVER TRUE

## 2021-08-10 ASSESSMENT — ENCOUNTER SYMPTOMS
PHOTOPHOBIA: 0
EYE ITCHING: 0
TROUBLE SWALLOWING: 0
CONSTIPATION: 0
ABDOMINAL DISTENTION: 0
DIARRHEA: 0
FACIAL SWELLING: 0
NAUSEA: 0
SORE THROAT: 0
BLOOD IN STOOL: 0
VOMITING: 0
SHORTNESS OF BREATH: 0
EYE PAIN: 0
COUGH: 0
STRIDOR: 0
RHINORRHEA: 0
WHEEZING: 0
COLOR CHANGE: 0
EYE DISCHARGE: 0
ANAL BLEEDING: 0
ABDOMINAL PAIN: 1

## 2021-08-10 ASSESSMENT — SOCIAL DETERMINANTS OF HEALTH (SDOH): HOW HARD IS IT FOR YOU TO PAY FOR THE VERY BASICS LIKE FOOD, HOUSING, MEDICAL CARE, AND HEATING?: NOT HARD AT ALL

## 2021-08-10 NOTE — ASSESSMENT & PLAN NOTE
Blood pressures are stable. Continue medications and monitor blood pressures at home. Call office if systolics are over 304 over diastolics over 90.

## 2021-08-10 NOTE — ASSESSMENT & PLAN NOTE
she just restarted her [place] Thyroid 90 mg recently.   We will check a TSH and a free T4 and make adjustments if needed

## 2021-08-10 NOTE — ASSESSMENT & PLAN NOTE
continue with diet, fasting blood sugars about 3 times a week and let me know if they are over 140 consistently. Continue medications. Prescription management.

## 2021-08-10 NOTE — PROGRESS NOTES
throat, tinnitus and trouble swallowing. Eyes: Negative for photophobia, pain, discharge, itching and visual disturbance. Respiratory: Negative for cough, shortness of breath, wheezing and stridor. Cardiovascular: Negative for chest pain, palpitations and leg swelling. Gastrointestinal: Positive for abdominal pain. Negative for abdominal distention, anal bleeding, blood in stool, constipation, diarrhea, nausea and vomiting. See HPI   Endocrine: Negative for cold intolerance, heat intolerance, polydipsia, polyphagia and polyuria. Genitourinary: Negative for difficulty urinating, dysuria, flank pain, frequency, hematuria and urgency. Musculoskeletal: Negative for arthralgias, gait problem, joint swelling and myalgias. Skin: Negative for color change, pallor and rash. Allergic/Immunologic: Negative for environmental allergies and food allergies. Neurological: Negative for dizziness, tremors, seizures, syncope, speech difficulty, weakness, light-headedness, numbness and headaches. Hematological: Negative for adenopathy. Does not bruise/bleed easily. Psychiatric/Behavioral: Negative for agitation, behavioral problems, confusion, sleep disturbance and suicidal ideas. The patient is not nervous/anxious.            Current Outpatient Medications:     pravastatin (PRAVACHOL) 20 MG tablet, Take 1 tablet by mouth daily, Disp: 30 tablet, Rfl: 5    BYDUREON BCISE 2 MG/0.85ML AUIJ, inject 1 pen into the skin once a week., Disp: 3.4 mL, Rfl: 0    telmisartan (MICARDIS) 80 MG tablet, Take 1 tablet by mouth daily, Disp: 30 tablet, Rfl: 11    metFORMIN (GLUCOPHAGE) 1000 MG tablet, Take 1 tablet by mouth 2 times daily (with meals), Disp: 180 tablet, Rfl: 1    glimepiride (AMARYL) 2 MG tablet, 1 tab in AM, 3 tabs in PM, Disp: 120 tablet, Rfl: 11    thyroid (ARMOUR THYROID) 90 MG tablet, Take 1 tablet by mouth daily, Disp: 30 tablet, Rfl: 11    metoprolol succinate (TOPROL XL) 100 MG extended release tablet, Take 1 tablet by mouth daily, Disp: 30 tablet, Rfl: 11    triamterene-hydroCHLOROthiazide (MAXZIDE) 75-50 MG per tablet, Take 1 tablet by mouth daily, Disp: 30 tablet, Rfl: 11    BESIVANCE 0.6 % SUSP, PLACE 1 DROP IN OPERATIVE EYE 3 TIMES DAILY BEGINNING 2 DAYS PRIOR TO SURGERY, Disp: , Rfl:     ibuprofen (ADVIL;MOTRIN) 800 MG tablet, TAKE ONE TABLET BY MOUTH EVERY 4 HOURS AS NEEDED, Disp: , Rfl:     blood glucose test strips (ASCENSIA AUTODISC VI;ONE TOUCH ULTRA TEST VI) strip, 1 each by In Vitro route 2 times daily One touch ultra blue., Disp: 200 each, Rfl: 5     No Known Allergies     Past Medical History:   Diagnosis Date    Acquired hypothyroidism 2019    Essential hypertension 2019    Mixed hyperlipidemia 2019    Type 2 diabetes mellitus with hyperglycemia, without long-term current use of insulin ([address] 2019       Health Maintenance Due   Topic Date Due    Hepatitis C screen  Never done    Diabetic foot exam  Never done    HIV screen  Never done    Cervical cancer screen  Never done    Shingles Vaccine (1 of 2) Never done    Colon cancer screen colonoscopy  2018    DTaP/Tdap/Td vaccine (2 - Td or Tdap) 2020    Diabetic retinal exam  2020    A1C test (Diabetic or Prediabetic)  2020    Diabetic microalbuminuria test  2020        Patient Active Problem List   Diagnosis    Type 2 diabetes mellitus with hyperglycemia, without long-term current use of insulin ([address]    Essential hypertension    Acquired hypothyroidism    Mixed hyperlipidemia    Encounter for pre-operative examination    Age-related incipient cataract of left eye    Right upper quadrant abdominal pain    Goiter    Multinodular goiter        Past Surgical History:   Procedure Laterality Date    APPENDECTOMY       SECTION      X3    CHOLECYSTECTOMY      COCCYX REMOVAL      WISDOM TOOTH EXTRACTION          Family History   Problem Relation Age of Onset  Heart Disease Father         Social History     Tobacco Use    Smoking status: Never Smoker    Smokeless tobacco: Never Used   Substance Use Topics    Alcohol use: Never    Drug use: Never        Objective  Vitals:    08/10/21 0710   BP: 136/72   Site: Right Upper Arm   Position: Sitting   Cuff Size: Medium Adult   Pulse: 78   Temp: 98.1 °F (36.7 °C)   TempSrc: Temporal   SpO2: 97%   Weight: 200 lb (90.7 kg)   Height: 5' 6\" (1.676 m)        Exam:  Physical Exam  Vitals reviewed. Constitutional:       General: She is not in acute distress. Appearance: She is well-developed. She is obese. She is not ill-appearing. HENT:      Head: Normocephalic. Right Ear: External ear normal.      Left Ear: External ear normal.   Eyes:      General: No scleral icterus. Right eye: No discharge. Left eye: No discharge. Conjunctiva/sclera: Conjunctivae normal.      Pupils: Pupils are equal, round, and reactive to light. Neck:      Thyroid: No thyromegaly. Comments: Thyroid slightly enlarged and nodular  Cardiovascular:      Rate and Rhythm: Normal rate and regular rhythm. Heart sounds: Normal heart sounds. No murmur heard. No friction rub. No gallop. Pulmonary:      Effort: Pulmonary effort is normal. No respiratory distress. Breath sounds: Normal breath sounds. No wheezing or rales. Chest:      Chest wall: No tenderness. Abdominal:      General: Bowel sounds are normal. There is no distension. Palpations: Abdomen is soft. Tenderness: There is no guarding or rebound. Comments: 1+ tender to palpation right upper quadrant. I cannot palpate gallbladder. Liver appears slightly enlarged. Musculoskeletal:         General: No tenderness or deformity. Normal range of motion. Cervical back: Normal range of motion and neck supple. Lymphadenopathy:      Cervical: No cervical adenopathy. Skin:     General: Skin is warm and dry.       Coloration: Skin is not pale. Findings: No erythema or rash. Neurological:      Mental Status: She is alert and oriented to person, place, and time. Cranial Nerves: No cranial nerve deficit. Sensory: No sensory deficit. Deep Tendon Reflexes: Reflexes normal.   Psychiatric:         Mood and Affect: Mood normal.         Behavior: Behavior normal.         Thought Content: Thought content normal.         Judgment: Judgment normal.          Last labs reviewed. ASSESSMENT & PLAN :   Problem List        Circulatory    Essential hypertension     Blood pressures are stable. Continue medications and monitor blood pressures at home. Call office if systolics are over 220 over diastolics over 90. Relevant Medications    metoprolol succinate (TOPROL XL) 100 MG extended release tablet    triamterene-hydroCHLOROthiazide (MAXZIDE) 75-50 MG per tablet    telmisartan (MICARDIS) 80 MG tablet    Other Relevant Orders    Comprehensive Metabolic Panel       Endocrine    Type 2 diabetes mellitus with hyperglycemia, without long-term current use of insulin (HCC)       continue with diet, fasting blood sugars about 3 times a week and let me know if they are over 140 consistently. Continue medications. Prescription management. Relevant Medications    blood glucose test strips (ASCENSIA AUTODISC VI;ONE TOUCH ULTRA TEST VI) strip    glimepiride (AMARYL) 2 MG tablet    metFORMIN (GLUCOPHAGE) 1000 MG tablet    BYDUREON BCISE 2 MG/0.85ML AUIJ    Other Relevant Orders    Comprehensive Metabolic Panel    Hemoglobin A1C    Microalbumin / Creatinine Urine Ratio    Acquired hypothyroidism       she just restarted her [place] Thyroid 90 mg recently.   We will check a TSH and a free T4 and make adjustments if needed         Relevant Medications    thyroid (ARMOUR THYROID) 90 MG tablet    Other Relevant Orders    T4, Free    TSH without Reflex    Multinodular goiter       monitor yearly ultrasounds         Relevant Medications thyroid (ARMOUR THYROID) 90 MG tablet       Other    Mixed hyperlipidemia     Watch saturated fats in diet and will monitor lipids  continue pravastatin 20 mg nightly         Relevant Medications    metoprolol succinate (TOPROL XL) 100 MG extended release tablet    triamterene-hydroCHLOROthiazide (MAXZIDE) 75-50 MG per tablet    telmisartan (MICARDIS) 80 MG tablet    pravastatin (PRAVACHOL) 20 MG tablet    Other Relevant Orders    Lipid Panel    Encounter for pre-operative examination - Primary       medically optimized for surgery, form filled out and will be sent to her ophthalmologist         Age-related incipient cataract of left eye       for surgery with Dr. Gracie Phillip                Return in about 4 months (around 12/10/2021).        Yolanda Dela Cruz, DO  8/10/2021

## 2021-09-08 DIAGNOSIS — E11.65 TYPE 2 DIABETES MELLITUS WITH HYPERGLYCEMIA, WITHOUT LONG-TERM CURRENT USE OF INSULIN (HCC): Primary | ICD-10-CM

## 2021-09-08 RX ORDER — EXENATIDE 2 MG/.85ML
INJECTION, SUSPENSION, EXTENDED RELEASE SUBCUTANEOUS
Qty: 3.4 ML | Refills: 2 | Status: SHIPPED
Start: 2021-09-08 | End: 2022-01-03 | Stop reason: SDUPTHER

## 2021-09-08 RX ORDER — METFORMIN HYDROCHLORIDE 500 MG/1
TABLET, EXTENDED RELEASE ORAL
COMMUNITY
Start: 2021-08-06 | End: 2021-12-13 | Stop reason: SDUPTHER

## 2021-12-13 DIAGNOSIS — I10 ESSENTIAL HYPERTENSION: ICD-10-CM

## 2021-12-13 DIAGNOSIS — E03.9 ACQUIRED HYPOTHYROIDISM: ICD-10-CM

## 2021-12-13 DIAGNOSIS — E11.65 TYPE 2 DIABETES MELLITUS WITH HYPERGLYCEMIA, WITHOUT LONG-TERM CURRENT USE OF INSULIN (HCC): ICD-10-CM

## 2021-12-13 RX ORDER — LEVOTHYROXINE AND LIOTHYRONINE 57; 13.5 UG/1; UG/1
90 TABLET ORAL DAILY
Qty: 30 TABLET | Refills: 11 | Status: SHIPPED
Start: 2021-12-13 | End: 2022-01-03 | Stop reason: SDUPTHER

## 2021-12-13 RX ORDER — METFORMIN HYDROCHLORIDE 500 MG/1
TABLET, EXTENDED RELEASE ORAL
Qty: 30 TABLET | Refills: 3 | Status: SHIPPED
Start: 2021-12-13 | End: 2022-01-03 | Stop reason: SDUPTHER

## 2021-12-13 RX ORDER — METOPROLOL SUCCINATE 100 MG/1
100 TABLET, EXTENDED RELEASE ORAL DAILY
Qty: 30 TABLET | Refills: 11 | Status: SHIPPED
Start: 2021-12-13 | End: 2022-01-03 | Stop reason: SDUPTHER

## 2021-12-13 RX ORDER — GLIMEPIRIDE 2 MG/1
TABLET ORAL
Qty: 120 TABLET | Refills: 11 | Status: SHIPPED
Start: 2021-12-13 | End: 2022-01-03 | Stop reason: SDUPTHER

## 2021-12-20 LAB — DIABETIC RETINOPATHY: POSITIVE

## 2022-01-03 ENCOUNTER — OFFICE VISIT (OUTPATIENT)
Dept: PRIMARY CARE CLINIC | Age: 62
End: 2022-01-03
Payer: COMMERCIAL

## 2022-01-03 VITALS
TEMPERATURE: 97.1 F | HEART RATE: 86 BPM | WEIGHT: 192 LBS | SYSTOLIC BLOOD PRESSURE: 138 MMHG | BODY MASS INDEX: 30.86 KG/M2 | DIASTOLIC BLOOD PRESSURE: 80 MMHG | HEIGHT: 66 IN | OXYGEN SATURATION: 98 %

## 2022-01-03 DIAGNOSIS — E03.9 ACQUIRED HYPOTHYROIDISM: ICD-10-CM

## 2022-01-03 DIAGNOSIS — I10 ESSENTIAL HYPERTENSION: ICD-10-CM

## 2022-01-03 DIAGNOSIS — E11.65 TYPE 2 DIABETES MELLITUS WITH HYPERGLYCEMIA, WITHOUT LONG-TERM CURRENT USE OF INSULIN (HCC): Primary | ICD-10-CM

## 2022-01-03 DIAGNOSIS — Z23 NEED FOR INFLUENZA VACCINATION: ICD-10-CM

## 2022-01-03 DIAGNOSIS — H00.014 HORDEOLUM EXTERNUM OF LEFT UPPER EYELID: ICD-10-CM

## 2022-01-03 DIAGNOSIS — E78.2 MIXED HYPERLIPIDEMIA: ICD-10-CM

## 2022-01-03 DIAGNOSIS — Z23 NEED FOR TETANUS, DIPHTHERIA, AND ACELLULAR PERTUSSIS (TDAP) VACCINE: ICD-10-CM

## 2022-01-03 LAB
A/G RATIO: 1.5 RATIO (ref 1.1–2.2)
ALBUMIN SERPL-MCNC: 4.5 G/DL (ref 3.4–4.8)
ALP BLD-CCNC: 112 U/L (ref 42–121)
ALT SERPL-CCNC: 28 U/L (ref 10–54)
ANION GAP SERPL CALCULATED.3IONS-SCNC: 12 MEQ/L (ref 3–11)
AST SERPL-CCNC: 25 U/L (ref 10–41)
BILIRUB SERPL-MCNC: 0.7 MG/DL (ref 0.3–1.5)
BUN BLDV-MCNC: 22 MG/DL (ref 8–21)
CALCIUM SERPL-MCNC: 9.4 MG/DL (ref 8.5–10.5)
CHLORIDE BLD-SCNC: 95 MEQ/L (ref 98–107)
CHOLESTEROL: 196 MG/DL (ref 140–200)
CO2: 22 MEQ/L (ref 21–31)
CREAT SERPL-MCNC: 1.1 MG/DL (ref 0.4–1)
CREATININE + EGFR PANEL: 61 ML/MIN
CREATININE URINE: 73.2 MG/DL (ref 22–328)
ESTIMATED AVERAGE GLUCOSE: 258 MG/DL
GFR NON-AFRICAN AMERICAN: 50 ML/MIN
GLOBULIN: 3.1 G/DL (ref 1.9–3.9)
GLUCOSE BLD-MCNC: 394 MG/DL (ref 70–99)
HBA1C MFR BLD: 10.6 % (ref 4–6)
HDLC SERPL-MCNC: 60 MG/DL (ref 35–85)
LDL CHOLESTEROL: 102 MG/DL
LDL/HDL RATIO: 1.7 RATIO
MICROALBUMIN UR-MCNC: 19.9 UG/ML
MICROALBUMIN/CREAT UR-RTO: 27.2 MG/G
POTASSIUM SERPL-SCNC: 4.4 MEQ/L (ref 3.6–5)
SODIUM BLD-SCNC: 129 MEQ/L (ref 135–145)
T4 FREE: 1.59 NG/DL (ref 0.61–1.12)
TOTAL PROTEIN: 7.6 G/DL (ref 5.9–7.8)
TRIGL SERPL-MCNC: 160 MG/DL (ref 41–189)
TSH SERPL DL<=0.05 MIU/L-ACNC: 2.32 UIU/ML (ref 0.34–5.6)

## 2022-01-03 PROCEDURE — 90674 CCIIV4 VAC NO PRSV 0.5 ML IM: CPT | Performed by: INTERNAL MEDICINE

## 2022-01-03 PROCEDURE — 90471 IMMUNIZATION ADMIN: CPT | Performed by: INTERNAL MEDICINE

## 2022-01-03 PROCEDURE — 99214 OFFICE O/P EST MOD 30 MIN: CPT | Performed by: INTERNAL MEDICINE

## 2022-01-03 RX ORDER — TRIAMTERENE AND HYDROCHLOROTHIAZIDE 75; 50 MG/1; MG/1
1 TABLET ORAL DAILY
Qty: 30 TABLET | Refills: 11 | Status: SHIPPED | OUTPATIENT
Start: 2022-01-03

## 2022-01-03 RX ORDER — LEVOTHYROXINE AND LIOTHYRONINE 57; 13.5 UG/1; UG/1
90 TABLET ORAL DAILY
Qty: 30 TABLET | Refills: 11 | Status: SHIPPED | OUTPATIENT
Start: 2022-01-03

## 2022-01-03 RX ORDER — BIOTIN 10 MG
10 TABLET ORAL DAILY
COMMUNITY

## 2022-01-03 RX ORDER — METOPROLOL SUCCINATE 100 MG/1
100 TABLET, EXTENDED RELEASE ORAL DAILY
Qty: 30 TABLET | Refills: 11 | Status: SHIPPED | OUTPATIENT
Start: 2022-01-03

## 2022-01-03 RX ORDER — TELMISARTAN 80 MG/1
80 TABLET ORAL DAILY
Qty: 30 TABLET | Refills: 11 | Status: SHIPPED | OUTPATIENT
Start: 2022-01-03

## 2022-01-03 RX ORDER — GLIMEPIRIDE 2 MG/1
TABLET ORAL
Qty: 120 TABLET | Refills: 11 | Status: SHIPPED
Start: 2022-01-03 | End: 2022-07-18

## 2022-01-03 RX ORDER — METFORMIN HYDROCHLORIDE 500 MG/1
TABLET, EXTENDED RELEASE ORAL
Qty: 30 TABLET | Refills: 3 | Status: SHIPPED
Start: 2022-01-03 | End: 2022-04-12 | Stop reason: SDUPTHER

## 2022-01-03 RX ORDER — EXENATIDE 2 MG/.85ML
INJECTION, SUSPENSION, EXTENDED RELEASE SUBCUTANEOUS
Qty: 3.4 ML | Refills: 2 | Status: SHIPPED
Start: 2022-01-03 | End: 2022-04-12 | Stop reason: SDUPTHER

## 2022-01-03 ASSESSMENT — ENCOUNTER SYMPTOMS
ANAL BLEEDING: 0
ABDOMINAL DISTENTION: 0
DIARRHEA: 0
CONSTIPATION: 0
BLOOD IN STOOL: 0
NAUSEA: 0
VOMITING: 0
EYE DISCHARGE: 0
STRIDOR: 0
FACIAL SWELLING: 0
RHINORRHEA: 0
EYE ITCHING: 0
SORE THROAT: 0
COLOR CHANGE: 0
WHEEZING: 0
EYE PAIN: 0
ABDOMINAL PAIN: 0
SHORTNESS OF BREATH: 0
TROUBLE SWALLOWING: 0
PHOTOPHOBIA: 0
COUGH: 0

## 2022-01-03 NOTE — ASSESSMENT & PLAN NOTE
Blood pressures are stable. Continue medications and monitor blood pressures at home. Call office if systolics are over 168 over diastolics over 90.   Check fasting CMP

## 2022-01-03 NOTE — PROGRESS NOTES
1/3/2022    Name: Hector Bowling : 1960 Sex: female  Age: 59 y.o. Subjective:  Chief Complaint   Patient presents with    Diabetes        Diabetes  Pertinent negatives for hypoglycemia include no confusion, dizziness, headaches, nervousness/anxiousness, pallor, seizures, speech difficulty or tremors. Pertinent negatives for diabetes include no chest pain, no fatigue, no polydipsia, no polyphagia, no polyuria and no weakness. Patient here for preoperative examination. She is scheduled for cataract surgery with Dr. Kristine Cooley on 2021. She had cataract surgery on the right eye previously now she is having it done on the left eye. Patient has a history of type 2 diabetes mellitus not on insulin, hypothyroidism, hypertension, hyperlipidemia. She is very intolerant to generic medications including levothyroxine. She felt best when she was on [place] Thyroid plus Cytomel. She was taken off Cytomel by endocrinologist and we are trying to obtain [place] Thyroid for her. Prior authorization is pending. She is on pravastatin 20 mg at bedtime, Bydureon 2 mg subcu weekly, [place] thyroid 90 mg daily, telmisartan  80 mg daily, Metformin 1000 mg twice a day with meals and metoprolol 100 mg daily. She also takes hydrochlorothiazide/triamterene 50/75 once a day. She is on glimepiride 2 mg tablets twice daily. She says her blood sugars are good but she does not bring her diary. We will check her fasting blood work [facility]    Screening mammogram showed no mammographic evidence of malignancy. She is due for screening colonoscopy which she is going to schedule with . She had her diabetic eye examination done with Dr. Kristine Cooley. Ultrasound of her thyroid showed a multinodular goiter  She has a stye in her left eyelid    She had her Covid vaccines in 2021. She had her Covid vaccine booster in 2021. She needs her flu shot.   She needs a Tdap as she is a new grandchild    Blood work in September showed improvement of her hemoglobin A1c to 9.1%. Free T4 was elevated at 3.52 but her TSH was normal.  We will need to check her free T3. And we may need to adjust her [place] Thyroid. Lipids were good. Creatinine was 1.0 with an estimated GFR 56, BUN 24, sodium 133 and fasting blood sugar had improved to 264. We will recheck      Review of Systems   Constitutional: Negative for appetite change, fatigue and unexpected weight change. HENT: Negative for congestion, ear pain, facial swelling, rhinorrhea, sore throat, tinnitus and trouble swallowing. Eyes: Negative for photophobia, pain, discharge, itching and visual disturbance. Stye left eyelid   Respiratory: Negative for cough, shortness of breath, wheezing and stridor. Cardiovascular: Negative for chest pain, palpitations and leg swelling. Gastrointestinal: Negative for abdominal distention, abdominal pain, anal bleeding, blood in stool, constipation, diarrhea, nausea and vomiting. Endocrine: Negative for cold intolerance, heat intolerance, polydipsia, polyphagia and polyuria. Genitourinary: Negative for difficulty urinating, dysuria, flank pain, frequency, hematuria and urgency. Musculoskeletal: Negative for arthralgias, gait problem, joint swelling and myalgias. Skin: Negative for color change, pallor and rash. Allergic/Immunologic: Negative for environmental allergies and food allergies. Neurological: Negative for dizziness, tremors, seizures, syncope, speech difficulty, weakness, light-headedness, numbness and headaches. Hematological: Negative for adenopathy. Does not bruise/bleed easily. Psychiatric/Behavioral: Negative for agitation, behavioral problems, confusion, sleep disturbance and suicidal ideas. The patient is not nervous/anxious.            Current Outpatient Medications:     Multiple Vitamins-Minerals (MULTIVITAMIN GUMMIES ADULTS PO), Take by mouth, Disp: , Rfl:    Cholecalciferol (VITAMIN D3) 125 MCG (5000 UT) TABS, Take by mouth, Disp: , Rfl:     Biotin 10 MG tablet, Take 10 mg by mouth daily, Disp: , Rfl:     Exenatide ER (BYDUREON BCISE) 2 MG/0.85ML AUIJ, inject 1 pen into the skin once a week., Disp: 3.4 mL, Rfl: 2    glimepiride (AMARYL) 2 MG tablet, 1 tab in AM, 3 tabs in PM, Disp: 120 tablet, Rfl: 11    metFORMIN (GLUCOPHAGE-XR) 500 MG extended release tablet, TAKE TWO TABLETS BY MOUTH TWO TIMES A DAY, Disp: 30 tablet, Rfl: 3    metoprolol succinate (TOPROL XL) 100 MG extended release tablet, Take 1 tablet by mouth daily, Disp: 30 tablet, Rfl: 11    telmisartan (MICARDIS) 80 MG tablet, Take 1 tablet by mouth daily, Disp: 30 tablet, Rfl: 11    thyroid (ARMOUR THYROID) 90 MG tablet, Take 1 tablet by mouth daily, Disp: 30 tablet, Rfl: 11    triamterene-hydroCHLOROthiazide (MAXZIDE) 75-50 MG per tablet, Take 1 tablet by mouth daily, Disp: 30 tablet, Rfl: 11    Tetanus-Diphth-Acell Pertussis (BOOSTRIX) 5-2.5-18.5 LF-MCG/0.5 injection, Inject 0.5 mLs into the muscle once for 1 dose, Disp: 1 each, Rfl: 0    pravastatin (PRAVACHOL) 20 MG tablet, Take 1 tablet by mouth daily, Disp: 30 tablet, Rfl: 5    BESIVANCE 0.6 % SUSP, PLACE 1 DROP IN OPERATIVE EYE 3 TIMES DAILY BEGINNING 2 DAYS PRIOR TO SURGERY, Disp: , Rfl:     ibuprofen (ADVIL;MOTRIN) 800 MG tablet, TAKE ONE TABLET BY MOUTH EVERY 4 HOURS AS NEEDED, Disp: , Rfl:     blood glucose test strips (ASCENSIA AUTODISC VI;ONE TOUCH ULTRA TEST VI) strip, 1 each by In Vitro route 2 times daily One touch ultra blue., Disp: 200 each, Rfl: 5     No Known Allergies     Past Medical History:   Diagnosis Date    Acquired hypothyroidism 11/14/2019    Essential hypertension 11/14/2019    Mixed hyperlipidemia 11/14/2019    Type 2 diabetes mellitus with hyperglycemia, without long-term current use of insulin [facility] 11/14/2019       Health Maintenance Due   Topic Date Due    Hepatitis C screen  Never done    Diabetic foot exam  Never done    HIV screen  Never done    Cervical cancer screen  Never done    Shingles Vaccine (1 of 2) Never done    Colon cancer screen colonoscopy  2018    DTaP/Tdap/Td vaccine (2 - Td or Tdap) 2020    A1C test (Diabetic or Prediabetic)  11/10/2021        Patient Active Problem List   Diagnosis    Type 2 diabetes mellitus with hyperglycemia, without long-term current use of insulin (HCC)    Essential hypertension    Acquired hypothyroidism    Mixed hyperlipidemia    Encounter for pre-operative examination    Age-related incipient cataract of left eye    Right upper quadrant abdominal pain    Goiter    Multinodular goiter    Hordeolum externum of left upper eyelid    Need for tetanus, diphtheria, and acellular pertussis (Tdap) vaccine    Need for influenza vaccination        Past Surgical History:   Procedure Laterality Date    APPENDECTOMY       SECTION      X3    CHOLECYSTECTOMY      COCCYX REMOVAL      WISDOM TOOTH EXTRACTION          Family History   Problem Relation Age of Onset    Heart Disease Father         Social History     Tobacco Use    Smoking status: Never Smoker    Smokeless tobacco: Never Used   Substance Use Topics    Alcohol use: Never    Drug use: Never        Objective  Vitals:    22 0748   BP: 138/80   Pulse: 86   Temp: 97.1 °F (36.2 °C)   TempSrc: Temporal   SpO2: 98%   Weight: 192 lb (87.1 kg)   Height: 5' 6\" (1.676 m)        Exam:  Physical Exam  Vitals reviewed. Constitutional:       General: She is not in acute distress. Appearance: She is well-developed. She is obese. She is not ill-appearing. HENT:      Head: Normocephalic. Right Ear: External ear normal.      Left Ear: External ear normal.   Eyes:      General: No scleral icterus. Right eye: No discharge. Left eye: No discharge. Conjunctiva/sclera: Conjunctivae normal.      Pupils: Pupils are equal, round, and reactive to light.       Comments: Hordeolum left upper eyelid   Neck:      Thyroid: No thyromegaly. Comments: Thyroid slightly enlarged and nodular  Cardiovascular:      Rate and Rhythm: Normal rate and regular rhythm. Heart sounds: Normal heart sounds. No murmur heard. No friction rub. No gallop. Pulmonary:      Effort: Pulmonary effort is normal. No respiratory distress. Breath sounds: Normal breath sounds. No wheezing or rales. Chest:      Chest wall: No tenderness. Abdominal:      General: Bowel sounds are normal. There is no distension. Palpations: Abdomen is soft. Tenderness: There is no guarding or rebound. Comments: . Liver appears slightly enlarged. Musculoskeletal:         General: No tenderness or deformity. Normal range of motion. Cervical back: Normal range of motion and neck supple. Lymphadenopathy:      Cervical: No cervical adenopathy. Skin:     General: Skin is warm and dry. Coloration: Skin is not pale. Findings: No erythema or rash. Neurological:      Mental Status: She is alert and oriented to person, place, and time. Cranial Nerves: No cranial nerve deficit. Sensory: No sensory deficit. Deep Tendon Reflexes: Reflexes normal.   Psychiatric:         Mood and Affect: Mood normal.         Behavior: Behavior normal.         Thought Content: Thought content normal.         Judgment: Judgment normal.          Last labs reviewed. ASSESSMENT & PLAN :   Problem List        Circulatory    Essential hypertension     Blood pressures are stable. Continue medications and monitor blood pressures at home. Call office if systolics are over 348 over diastolics over 90.   Check fasting CMP         Relevant Medications    metoprolol succinate (TOPROL XL) 100 MG extended release tablet    telmisartan (MICARDIS) 80 MG tablet    triamterene-hydroCHLOROthiazide (MAXZIDE) 75-50 MG per tablet    Other Relevant Orders    Comprehensive Metabolic Panel       Endocrine    Type 2 diabetes mellitus with hyperglycemia, without long-term current use of insulin ([facility]) - Primary       watch diet, check blood sugars, check hemoglobin A1c, continue Metformin and glimepiride along with Bydureon injections weekly         Relevant Medications    blood glucose test strips (ASCENSIA AUTODISC VI;ONE TOUCH ULTRA TEST VI) strip    Exenatide ER (BYDUREON BCISE) 2 MG/0.85ML AUIJ    glimepiride (AMARYL) 2 MG tablet    metFORMIN (GLUCOPHAGE-XR) 500 MG extended release tablet    Other Relevant Orders    Comprehensive Metabolic Panel    Hemoglobin A1C    Microalbumin / Creatinine Urine Ratio    Acquired hypothyroidism       continue [place] Thyroid 90 mg a day check TSH, free T4 and free T3 and may need to adjust doses         Relevant Medications    thyroid (ARMOUR THYROID) 90 MG tablet    Other Relevant Orders    T4, Free    TSH without Reflex    T3, FREE       Other    Mixed hyperlipidemia       lipids are good, continue diet and pravastatin. Check fasting lipid profile         Relevant Medications    pravastatin (PRAVACHOL) 20 MG tablet    metoprolol succinate (TOPROL XL) 100 MG extended release tablet    telmisartan (MICARDIS) 80 MG tablet    triamterene-hydroCHLOROthiazide (MAXZIDE) 75-50 MG per tablet    Other Relevant Orders    Comprehensive Metabolic Panel    Lipid Panel    Hordeolum externum of left upper eyelid       no vision changes.   Warm moist compresses 3-4 times a day and if no improvement call me and I will send in antibiotic ointment         Need for tetanus, diphtheria, and acellular pertussis (Tdap) vaccine       Tdap requisition sent to pharmacy get about a week or 2 after her flu shot         Relevant Medications    Tetanus-Diphth-Acell Pertussis ([address]) 5-2.5-18.5 LF-MCG/0.5 injection    Need for influenza vaccination       quadrivalent flu shot given         Relevant Orders    INFLUENZA, MDCK QUADV, 2 YRS AND OLDER, IM, PF, PREFILL SYR OR SDV, 0.5ML (FLUCELVAX QUADV, PF) (Completed) Return in about 3 months (around 4/3/2022), or PAP, DM.        Wanda Doyle,   1/3/2022

## 2022-01-03 NOTE — ASSESSMENT & PLAN NOTE
no vision changes.   Warm moist compresses 3-4 times a day and if no improvement call me and I will send in antibiotic ointment

## 2022-01-03 NOTE — PATIENT INSTRUCTIONS
Get blood work done today at [address] [place] [address] (Flu) Vaccine (Inactivated or Recombinant): What You Need to Know  Why get vaccinated? Influenza vaccine can prevent influenza (flu). Flu is a contagious disease that spreads around the [place] every year, usually between October and May. Anyone can get the flu, but it is more dangerous for some people. Infants and young children, people 72 years and older, pregnant people, and people with certain health conditions or a weakened immune system are at greatest risk of flu complications. Pneumonia, bronchitis, sinus infections, and ear infections are examples of flu-related complications. If you have a medical condition, such as heart disease, cancer, or diabetes, flu can make it worse. Flu can cause fever and chills, sore throat, muscle aches, fatigue, cough, headache, and runny or stuffy nose. Some people may have vomiting and diarrhea, though this is more common in children than adults. Each year, thousands of people in the [facility] die from flu, and many more are hospitalized. Flu vaccine prevents millions of illnesses and flu-related visits to the doctor each year. Influenza vaccines  CDC recommends everyone 6 months and older get vaccinated every flu season. Children 6 months through 6years of age may need 2 doses during a single flu season. Everyone else needs only 1 dose each flu season. It takes about 2 weeks for protection to develop after vaccination. There are many flu viruses, and they are always changing. Each year a new flu vaccine is made to protect against the influenza viruses believed to be likely to cause disease in the upcoming flu season. Even when the vaccine doesn't exactly match these viruses, it may still provide some protection. Influenza vaccine does not cause flu. Influenza vaccine may be given at the same time as other vaccines.   Talk with your health care provider  Tell your vaccination provider if the person getting the vaccine:  · Has had an allergic reaction after a previous dose of influenza vaccine, or has any severe, life-threatening allergies  · Has ever had Guillain-Barré Syndrome (also called \"GBS\")  In some cases, your health care provider may decide to postpone influenza vaccination until a future visit. Influenza vaccine can be administered at any time during pregnancy. People who are or will be pregnant during influenza season should receive inactivated influenza vaccine. People with minor illnesses, such as a cold, may be vaccinated. People who are moderately or severely ill should usually wait until they recover before getting influenza vaccine. Your health care provider can give you more information. Risks of a vaccine reaction  · Soreness, redness, and swelling where the shot is given, fever, muscle aches, and headache can happen after influenza vaccination. · There may be a very small increased risk of Guillain-Barré Syndrome (GBS) after inactivated influenza vaccine (the flu shot). [facility] children who get the flu shot along with pneumococcal vaccine (PCV13) and/or DTaP vaccine at the same time might be slightly more likely to have a seizure caused by fever. Tell your health care provider if a child who is getting flu vaccine has ever had a seizure. People sometimes faint after medical procedures, including vaccination. Tell your provider if you feel dizzy or have vision changes or ringing in the ears. As with any medicine, there is a very remote chance of a vaccine causing a severe allergic reaction, other serious injury, or death. What if there is a serious problem? An allergic reaction could occur after the vaccinated person leaves the clinic.  If you see signs of a severe allergic reaction (hives, swelling of the face and throat, difficulty breathing, a fast heartbeat, dizziness, or weakness), call 9-1-1 and get the person to the nearest hospital.  For other signs that concern you, call your health care provider. Adverse reactions should be reported to the Vaccine Adverse Event Reporting System (VAERS). Your health care provider will usually file this report, or you can do it yourself. Visit the VAERS website at www.vaers. hhs.gov or call 2-618.816.3906. VAERS is only for reporting reactions, and VAERS staff members do not give medical advice. The National Vaccine Injury Compensation Program  The National Vaccine Injury Compensation Program (VICP) is a federal program that was created to compensate people who may have been injured by certain vaccines. Claims regarding alleged injury or death due to vaccination have a time limit for filing, which may be as short as two years. Visit the VICP website at www.[facility].gov/vaccinecompensation or call 6-521.553.6118 to learn about the program and about filing a claim. How can I learn more? · Ask your health care provider. · Call your local or state health department. · Visit the website of the Food and Drug Administration (FDA) for vaccine package inserts and additional information at www.fda.gov/vaccines-blood-biologics/vaccines. · Contact the Centers for Disease Control and Prevention (CDC):  ? Call 8-476.444.8637 (1-800-CDC-INFO) or  ? Visit CDC's website at www.cdc.gov/flu. Vaccine Information Statement  Inactivated Influenza Vaccine  8/6/2021  42 JES [address] [id number]  Department of [facility] and [employer] for Disease Control and Prevention  Many vaccine information statements are available in [place] citizen and other languages. See www.immunize.org/vis. Hojas de información sobre vacunas están disponibles en español y en muchos otros idiomas. Visite www.immunize.org/vis. Care instructions adapted under license by [facility] ([facility]). If you have questions about a medical condition or this instruction, always ask your healthcare professional. [address] any warranty or liability for your use of this information.

## 2022-01-03 NOTE — ASSESSMENT & PLAN NOTE
watch diet, check blood sugars, check hemoglobin A1c, continue Metformin and glimepiride along with Bydureon injections weekly

## 2022-01-04 LAB — T3 FREE: 3.4 PG/ML (ref 2–4.4)

## 2022-01-10 ENCOUNTER — TELEPHONE (OUTPATIENT)
Dept: PRIMARY CARE CLINIC | Age: 62
End: 2022-01-10

## 2022-01-10 NOTE — TELEPHONE ENCOUNTER
Last Appointment:  1/3/2022  Future Appointments   Date Time Provider Sharon Owens   4/4/2022  8:00 AM DO Georgina Marques [facility] thyroid prior auth started.       Electronically signed by Pepe Garnica LPN on 8/86/0185 at 8:10 PM

## 2022-02-02 PROBLEM — Z23 NEED FOR INFLUENZA VACCINATION: Status: RESOLVED | Noted: 2022-01-03 | Resolved: 2022-02-02

## 2022-04-12 ENCOUNTER — OFFICE VISIT (OUTPATIENT)
Dept: PRIMARY CARE CLINIC | Age: 62
End: 2022-04-12
Payer: COMMERCIAL

## 2022-04-12 VITALS
HEART RATE: 81 BPM | SYSTOLIC BLOOD PRESSURE: 132 MMHG | DIASTOLIC BLOOD PRESSURE: 80 MMHG | WEIGHT: 195 LBS | BODY MASS INDEX: 31.47 KG/M2 | TEMPERATURE: 96.8 F | OXYGEN SATURATION: 99 %

## 2022-04-12 DIAGNOSIS — N18.31 STAGE 3A CHRONIC KIDNEY DISEASE (HCC): ICD-10-CM

## 2022-04-12 DIAGNOSIS — E03.9 ACQUIRED HYPOTHYROIDISM: ICD-10-CM

## 2022-04-12 DIAGNOSIS — I10 ESSENTIAL HYPERTENSION: ICD-10-CM

## 2022-04-12 DIAGNOSIS — Z12.31 ENCOUNTER FOR SCREENING MAMMOGRAM FOR BREAST CANCER: ICD-10-CM

## 2022-04-12 DIAGNOSIS — E78.2 MIXED HYPERLIPIDEMIA: ICD-10-CM

## 2022-04-12 DIAGNOSIS — E11.65 TYPE 2 DIABETES MELLITUS WITH HYPERGLYCEMIA, WITHOUT LONG-TERM CURRENT USE OF INSULIN (HCC): Primary | ICD-10-CM

## 2022-04-12 DIAGNOSIS — Z12.11 SCREEN FOR COLON CANCER: ICD-10-CM

## 2022-04-12 PROBLEM — H25.092 AGE-RELATED INCIPIENT CATARACT OF LEFT EYE: Status: RESOLVED | Noted: 2020-01-06 | Resolved: 2022-04-12

## 2022-04-12 PROBLEM — Z01.818 ENCOUNTER FOR PRE-OPERATIVE EXAMINATION: Status: RESOLVED | Noted: 2020-01-06 | Resolved: 2022-04-12

## 2022-04-12 PROBLEM — H00.014 HORDEOLUM EXTERNUM OF LEFT UPPER EYELID: Status: RESOLVED | Noted: 2022-01-03 | Resolved: 2022-04-12

## 2022-04-12 PROBLEM — Z23 NEED FOR TETANUS, DIPHTHERIA, AND ACELLULAR PERTUSSIS (TDAP) VACCINE: Status: RESOLVED | Noted: 2022-01-03 | Resolved: 2022-04-12

## 2022-04-12 PROCEDURE — 3046F HEMOGLOBIN A1C LEVEL >9.0%: CPT | Performed by: INTERNAL MEDICINE

## 2022-04-12 PROCEDURE — 99214 OFFICE O/P EST MOD 30 MIN: CPT | Performed by: INTERNAL MEDICINE

## 2022-04-12 RX ORDER — GLUCOSAMINE HCL/CHONDROITIN SU 500-400 MG
CAPSULE ORAL
Qty: 100 STRIP | Refills: 3 | Status: SHIPPED | OUTPATIENT
Start: 2022-04-12 | End: 2022-07-18 | Stop reason: SDUPTHER

## 2022-04-12 RX ORDER — METFORMIN HYDROCHLORIDE 500 MG/1
TABLET, EXTENDED RELEASE ORAL
Qty: 30 TABLET | Refills: 5 | Status: SHIPPED
Start: 2022-04-12 | End: 2022-07-18 | Stop reason: SDUPTHER

## 2022-04-12 RX ORDER — BLOOD-GLUCOSE METER
1 KIT MISCELLANEOUS DAILY
Qty: 1 KIT | Refills: 0 | Status: SHIPPED | OUTPATIENT
Start: 2022-04-12

## 2022-04-12 RX ORDER — EXENATIDE 2 MG/.85ML
INJECTION, SUSPENSION, EXTENDED RELEASE SUBCUTANEOUS
Qty: 3.4 ML | Refills: 5 | Status: SHIPPED | OUTPATIENT
Start: 2022-04-12

## 2022-04-12 RX ORDER — PRAVASTATIN SODIUM 20 MG
20 TABLET ORAL DAILY
Qty: 30 TABLET | Refills: 5 | Status: SHIPPED | OUTPATIENT
Start: 2022-04-12

## 2022-04-12 ASSESSMENT — ENCOUNTER SYMPTOMS
EYE DISCHARGE: 0
SORE THROAT: 0
WHEEZING: 0
EYE PAIN: 0
BLOOD IN STOOL: 0
PHOTOPHOBIA: 0
DIARRHEA: 0
COLOR CHANGE: 0
NAUSEA: 0
FACIAL SWELLING: 0
VOMITING: 0
COUGH: 0
RHINORRHEA: 0
SHORTNESS OF BREATH: 0
EYE ITCHING: 0
TROUBLE SWALLOWING: 0
ABDOMINAL DISTENTION: 0
ANAL BLEEDING: 0
ABDOMINAL PAIN: 0
STRIDOR: 0
CONSTIPATION: 0

## 2022-04-12 NOTE — ASSESSMENT & PLAN NOTE
check fasting blood sugar every day and call if consistently over 140. Prescription for Contour meter and supplies given. PT by joselo, Metformin and glimepiride. Watch diet.   Hemoglobin A1c to be done at [facility]

## 2022-04-12 NOTE — PROGRESS NOTES
2022    Name: Parul Burdick : 1960 Sex: female  Age: 59 y.o. Subjective:  Chief Complaint   Patient presents with    Diabetes        Diabetes  Pertinent negatives for hypoglycemia include no confusion, dizziness, headaches, nervousness/anxiousness, pallor, seizures, speech difficulty or tremors. Pertinent negatives for diabetes include no chest pain, no fatigue, no polydipsia, no polyphagia, no polyuria and no weakness. Patient has a history of type 2 diabetes mellitus not on insulin, hypothyroidism, hypertension, hyperlipidemia. She is very intolerant to generic medications including levothyroxine. She felt best when she was on [place] Thyroid plus Cytomel. She was taken off Cytomel by endocrinologist and we are trying to obtain [place] Thyroid for her. She is on pravastatin 20 mg at bedtime, Bydureon 2 mg subcu weekly, [place] thyroid 90 mg daily, telmisartan  80 mg daily, Metformin 1000 mg twice a day with meals and metoprolol 100 mg daily. She also takes hydrochlorothiazide/triamterene 50/75 once a day. She is on glimepiride 2 mg tablets twice daily. She says her blood sugars are good but she does not bring her diary. We will check her  blood work  at [facility]    Screening mammogram showed no mammographic evidence of malignancy. Her mammogram. Will be due In 2022    She will be referred for creening colonoscopy  with  an estimated GFR of 50 she had her diabetic eye examination done with Dr. Lemus Sit    Ultrasound of her thyroid showed a multinodular goiter    Pelviic and Pap  will be done on her next office visit      She had her Covid vaccines in 2021. She had her Covid vaccine booster in 2021. She needs her flu shot. She had her Tdap . She will see how much  Shingrix vaccine costs    Blood work kidney 2022 showed hemoglobin A1c was 10.6%. This was before she got her Bydureon prescription .   Fasting blood sugar was.  394 estimated GFR was 51.1. Serum sodium is low at 129 which is secondary to elevated blood sugar. Lipids and thyroid blood work were normal      Review of Systems   Constitutional: Negative for appetite change, fatigue and unexpected weight change. HENT: Negative for congestion, ear pain, facial swelling, rhinorrhea, sore throat, tinnitus and trouble swallowing. Eyes: Negative for photophobia, pain, discharge, itching and visual disturbance. Respiratory: Negative for cough, shortness of breath, wheezing and stridor. Cardiovascular: Negative for chest pain, palpitations and leg swelling. Gastrointestinal: Negative for abdominal distention, abdominal pain, anal bleeding, blood in stool, constipation, diarrhea, nausea and vomiting. Endocrine: Negative for cold intolerance, heat intolerance, polydipsia, polyphagia and polyuria. Genitourinary: Negative for difficulty urinating, dysuria, flank pain, frequency, hematuria and urgency. Musculoskeletal: Negative for arthralgias, gait problem, joint swelling and myalgias. Skin: Negative for color change, pallor and rash. Allergic/Immunologic: Negative for environmental allergies and food allergies. Neurological: Negative for dizziness, tremors, seizures, syncope, speech difficulty, weakness, light-headedness, numbness and headaches. Hematological: Negative for adenopathy. Does not bruise/bleed easily. Psychiatric/Behavioral: Negative for agitation, behavioral problems, confusion, sleep disturbance and suicidal ideas. The patient is not nervous/anxious.            Current Outpatient Medications:     metFORMIN (GLUCOPHAGE-XR) 500 MG extended release tablet, TAKE TWO TABLETS BY MOUTH TWO TIMES A DAY, Disp: 30 tablet, Rfl: 5    pravastatin (PRAVACHOL) 20 MG tablet, Take 1 tablet by mouth daily, Disp: 30 tablet, Rfl: 5    Exenatide ER (BYDUREON BCISE) 2 MG/0.85ML AUIJ, INJECT 1 PEN INTO THE SKIN ONCE A WEEK., Disp: 3.4 mL, Rfl: 5    glucose monitoring (FREESTYLE FREEDOM) kit, 1 kit by Does not apply route daily Contour brand, Disp: 1 kit, Rfl: 0    blood glucose monitor strips, Test 1 times a day & as needed for symptoms of irregular blood glucose. Dispense sufficient amount for indicated testing frequency plus additional to accommodate PRN testing needs.  Contour, Disp: 100 strip, Rfl: 3    Multiple Vitamins-Minerals (MULTIVITAMIN GUMMIES ADULTS PO), Take by mouth, Disp: , Rfl:     Cholecalciferol (VITAMIN D3) 125 MCG (5000 UT) TABS, Take by mouth, Disp: , Rfl:     Biotin 10 MG tablet, Take 10 mg by mouth daily, Disp: , Rfl:     glimepiride (AMARYL) 2 MG tablet, 1 tab in AM, 3 tabs in PM, Disp: 120 tablet, Rfl: 11    metoprolol succinate (TOPROL XL) 100 MG extended release tablet, Take 1 tablet by mouth daily, Disp: 30 tablet, Rfl: 11    telmisartan (MICARDIS) 80 MG tablet, Take 1 tablet by mouth daily, Disp: 30 tablet, Rfl: 11    thyroid ([place] THYROID) 90 MG tablet, Take 1 tablet by mouth daily, Disp: 30 tablet, Rfl: 11    triamterene-hydroCHLOROthiazide (MAXZIDE) 75-50 MG per tablet, Take 1 tablet by mouth daily, Disp: 30 tablet, Rfl: 11    BESIVANCE 0.6 % SUSP, PLACE 1 DROP IN OPERATIVE EYE 3 TIMES DAILY BEGINNING 2 DAYS PRIOR TO SURGERY, Disp: , Rfl:     ibuprofen (ADVIL;MOTRIN) 800 MG tablet, TAKE ONE TABLET BY MOUTH EVERY 4 HOURS AS NEEDED, Disp: , Rfl:      No Known Allergies     Past Medical History:   Diagnosis Date    Acquired hypothyroidism 11/14/2019    Essential hypertension 11/14/2019    Mixed hyperlipidemia 11/14/2019    Type 2 diabetes mellitus with hyperglycemia, without long-term current use of insulin ([facility] [address] 11/14/2019       Health Maintenance Due   Topic Date Due    Hepatitis C screen  Never done    Diabetic foot exam  Never done    HIV screen  Never done    Cervical cancer screen  Never done    Shingles Vaccine (1 of 2) Never done    Colorectal Cancer Screen  11/04/2018    A1C test (Diabetic or Prediabetic) 2022    Depression Screen  2022        Patient Active Problem List   Diagnosis    Type 2 diabetes mellitus with hyperglycemia, without long-term current use of insulin ([facility] [address]    Essential hypertension    Acquired hypothyroidism    Mixed hyperlipidemia    Right upper quadrant abdominal pain    Goiter    Encounter for screening mammogram for breast cancer    Multinodular goiter    Stage 3a chronic kidney disease ([facility] [address]    Screen for colon cancer        Past Surgical History:   Procedure Laterality Date    APPENDECTOMY       SECTION      X3    CHOLECYSTECTOMY      COCCYX REMOVAL      WISDOM TOOTH EXTRACTION          Family History   Problem Relation Age of Onset    Heart Disease Father         Social History     Tobacco Use    Smoking status: Never Smoker    Smokeless tobacco: Never Used   Substance Use Topics    Alcohol use: Never    Drug use: Never        Objective  Vitals:    22 0707   BP: 132/80   Pulse: 81   Temp: 96.8 °F (36 °C)   TempSrc: Temporal   SpO2: 99%   Weight: 195 lb (88.5 kg)        Exam:  Physical Exam  Vitals reviewed. Constitutional:       General: She is not in acute distress. Appearance: She is well-developed. She is obese. She is not ill-appearing. HENT:      Head: Normocephalic. Right Ear: External ear normal.      Left Ear: External ear normal.   Eyes:      General: No scleral icterus. Right eye: No discharge. Left eye: No discharge. Conjunctiva/sclera: Conjunctivae normal.      Pupils: Pupils are equal, round, and reactive to light. Neck:      Thyroid: No thyromegaly. Comments: Thyroid slightly enlarged and nodular  Cardiovascular:      Rate and Rhythm: Normal rate and regular rhythm. Heart sounds: Normal heart sounds. No murmur heard. No friction rub. No gallop. Pulmonary:      Effort: Pulmonary effort is normal. No respiratory distress. Breath sounds: Normal breath sounds. No wheezing or rales. Chest:      Chest wall: No tenderness. Abdominal:      General: Bowel sounds are normal. There is no distension. Palpations: Abdomen is soft. Tenderness: There is no guarding or rebound. Comments: . Liver appears slightly enlarged. Musculoskeletal:         General: No tenderness or deformity. Normal range of motion. Cervical back: Normal range of motion and neck supple. Lymphadenopathy:      Cervical: No cervical adenopathy. Skin:     General: Skin is warm and dry. Coloration: Skin is not pale. Findings: No erythema or rash. Neurological:      Mental Status: She is alert and oriented to person, place, and time. Cranial Nerves: No cranial nerve deficit. Sensory: No sensory deficit. Deep Tendon Reflexes: Reflexes normal.   Psychiatric:         Mood and Affect: Mood normal.         Behavior: Behavior normal.         Thought Content: Thought content normal.         Judgment: Judgment normal.          Last labs reviewed. ASSESSMENT & PLAN :   Problem List        Circulatory    Essential hypertension     Blood pressures are stable. Continue medications and monitor blood pressures at home. Call office if systolics are over 706 over diastolics over 90. Relevant Medications    metoprolol succinate (TOPROL XL) 100 MG extended release tablet    telmisartan (MICARDIS) 80 MG tablet    triamterene-hydroCHLOROthiazide (MAXZIDE) 75-50 MG per tablet    Other Relevant Orders    Comprehensive Metabolic Panel       Endocrine    Type 2 diabetes mellitus with hyperglycemia, without long-term current use of insulin (HCC) - Primary       check fasting blood sugar every day and call if consistently over 140. Prescription for Contour meter and supplies given. PT by joselo, Metformin and glimepiride. Watch diet.   Hemoglobin A1c to be done at [facility]         Relevant Medications    glimepiride (AMARYL) 2 MG tablet    metFORMIN (GLUCOPHAGE-XR) 500 MG extended release tablet    Exenatide ER (BYDUREON BCISE) 2 MG/0.85ML ALFONZO    glucose monitoring (FREESTYLE FREEDOM) kit    blood glucose monitor strips    Other Relevant Orders    Hemoglobin A1C    Comprehensive Metabolic Panel    Acquired hypothyroidism       continue [place] thyroid  MARISELA         Relevant Medications    thyroid ([place] THYROID) 90 MG tablet       Genitourinary    Stage 3a chronic kidney disease (HCC)       Avoid NSAID's and will monitor kidney functions         Relevant Orders    Comprehensive Metabolic Panel       Other    Mixed hyperlipidemia     Watch saturated fats in diet and will monitor lipids  continue pravastatin 20 mg daily         Relevant Medications    metoprolol succinate (TOPROL XL) 100 MG extended release tablet    telmisartan (MICARDIS) 80 MG tablet    triamterene-hydroCHLOROthiazide (MAXZIDE) 75-50 MG per tablet    pravastatin (PRAVACHOL) 20 MG tablet    Encounter for screening mammogram for breast cancer       mammogram requesition given         Relevant Orders    [facility] DIGITAL SCREEN W OR WO CAD BILATERAL    Screen for colon cancer       referred to [place] [facility] for screening colonoscopy         Relevant Orders    Amb External Referral To Gastroenterology           Return in about 3 months (around 7/12/2022), or DM and PAP and Pelvic.        Saar Srinivasan DO  4/12/2022

## 2022-04-12 NOTE — ASSESSMENT & PLAN NOTE
Blood pressures are stable. Continue medications and monitor blood pressures at home. Call office if systolics are over 868 over diastolics over 90.

## 2022-05-12 PROBLEM — Z12.31 ENCOUNTER FOR SCREENING MAMMOGRAM FOR BREAST CANCER: Status: RESOLVED | Noted: 2021-04-13 | Resolved: 2022-05-12

## 2022-05-12 PROBLEM — Z12.11 SCREEN FOR COLON CANCER: Status: RESOLVED | Noted: 2022-04-12 | Resolved: 2022-05-12

## 2022-07-18 ENCOUNTER — OFFICE VISIT (OUTPATIENT)
Dept: PRIMARY CARE CLINIC | Age: 62
End: 2022-07-18
Payer: COMMERCIAL

## 2022-07-18 VITALS
WEIGHT: 194 LBS | BODY MASS INDEX: 31.18 KG/M2 | DIASTOLIC BLOOD PRESSURE: 82 MMHG | TEMPERATURE: 97.3 F | HEART RATE: 81 BPM | HEIGHT: 66 IN | OXYGEN SATURATION: 96 % | SYSTOLIC BLOOD PRESSURE: 126 MMHG

## 2022-07-18 DIAGNOSIS — N18.31 STAGE 3A CHRONIC KIDNEY DISEASE (HCC): ICD-10-CM

## 2022-07-18 DIAGNOSIS — E03.9 ACQUIRED HYPOTHYROIDISM: ICD-10-CM

## 2022-07-18 DIAGNOSIS — F41.9 ANXIETY: ICD-10-CM

## 2022-07-18 DIAGNOSIS — E78.2 MIXED HYPERLIPIDEMIA: ICD-10-CM

## 2022-07-18 DIAGNOSIS — E11.65 TYPE 2 DIABETES MELLITUS WITH HYPERGLYCEMIA, WITHOUT LONG-TERM CURRENT USE OF INSULIN (HCC): Primary | ICD-10-CM

## 2022-07-18 DIAGNOSIS — I10 ESSENTIAL HYPERTENSION: ICD-10-CM

## 2022-07-18 LAB — HBA1C MFR BLD: 13.4 %

## 2022-07-18 PROCEDURE — 83036 HEMOGLOBIN GLYCOSYLATED A1C: CPT | Performed by: INTERNAL MEDICINE

## 2022-07-18 PROCEDURE — 99214 OFFICE O/P EST MOD 30 MIN: CPT | Performed by: INTERNAL MEDICINE

## 2022-07-18 PROCEDURE — 3046F HEMOGLOBIN A1C LEVEL >9.0%: CPT | Performed by: INTERNAL MEDICINE

## 2022-07-18 RX ORDER — ALPRAZOLAM 0.25 MG/1
0.25 TABLET ORAL NIGHTLY PRN
Qty: 30 TABLET | Refills: 0 | Status: SHIPPED | OUTPATIENT
Start: 2022-07-18 | End: 2022-08-17

## 2022-07-18 RX ORDER — GLUCOSAMINE HCL/CHONDROITIN SU 500-400 MG
CAPSULE ORAL
Qty: 100 STRIP | Refills: 3 | Status: SHIPPED | OUTPATIENT
Start: 2022-07-18

## 2022-07-18 RX ORDER — GLIPIZIDE 10 MG/1
10 TABLET ORAL 2 TIMES DAILY
Qty: 60 TABLET | Refills: 3 | Status: SHIPPED | OUTPATIENT
Start: 2022-07-18

## 2022-07-18 RX ORDER — METFORMIN HYDROCHLORIDE 500 MG/1
TABLET, EXTENDED RELEASE ORAL
Qty: 120 TABLET | Refills: 5 | Status: SHIPPED | OUTPATIENT
Start: 2022-07-18

## 2022-07-18 ASSESSMENT — ENCOUNTER SYMPTOMS
PHOTOPHOBIA: 0
RHINORRHEA: 0
CONSTIPATION: 0
NAUSEA: 0
EYE DISCHARGE: 0
ABDOMINAL PAIN: 0
BLOOD IN STOOL: 0
EYE ITCHING: 0
ANAL BLEEDING: 0
EYE PAIN: 0
FACIAL SWELLING: 0
VOMITING: 0
SHORTNESS OF BREATH: 0
ABDOMINAL DISTENTION: 0
COUGH: 0
SORE THROAT: 0
WHEEZING: 0
TROUBLE SWALLOWING: 0
DIARRHEA: 0
COLOR CHANGE: 0
STRIDOR: 0

## 2022-07-18 ASSESSMENT — PATIENT HEALTH QUESTIONNAIRE - PHQ9
SUM OF ALL RESPONSES TO PHQ QUESTIONS 1-9: 0
SUM OF ALL RESPONSES TO PHQ QUESTIONS 1-9: 0
2. FEELING DOWN, DEPRESSED OR HOPELESS: 0
SUM OF ALL RESPONSES TO PHQ QUESTIONS 1-9: 0
SUM OF ALL RESPONSES TO PHQ QUESTIONS 1-9: 0

## 2022-07-18 NOTE — ASSESSMENT & PLAN NOTE
Globin  POCT A1c was high at 13.4%. She is aware that she is not watching his her diet as carefully as she should. She is under a lot of stress which would increase her blood sugars. She does not think her glimepiride is working. We will switch her to glipizide 10 mg twice daily with meals, continue her Bydureon 2 mg weekly subcu, metformin which she takes 500 mg in the morning and 1500 mg at night. Prescription for blood sugar strips and she is to monitor her fasting blood sugar at least 3 times a week. Watch her diet more carefully.   Let me know if her fasting blood sugars stay over 150

## 2022-07-18 NOTE — ASSESSMENT & PLAN NOTE
check TSH and free T4 when she turns Medicare age.   In the meantime continue her [place] Thyroid 90 mg daily

## 2022-07-18 NOTE — ASSESSMENT & PLAN NOTE
[facility] report reviewed. Medication contract signed.   Prescription for alprazolam 0.25 mg at night as needed anxiety #30 with no refills

## 2022-07-18 NOTE — ASSESSMENT & PLAN NOTE
Blood pressures are stable. Continue medications and monitor blood pressures at home. Call office if systolics are over 520 over diastolics over 90.

## 2022-07-18 NOTE — PROGRESS NOTES
2022    Name: Tanika Courser : 1960 Sex: female  Age: 57 y.o. Subjective:  Chief Complaint   Patient presents with    Diabetes        Diabetes  Pertinent negatives for hypoglycemia include no confusion, dizziness, headaches, nervousness/anxiousness, pallor, seizures, speech difficulty or tremors. Associated symptoms include polydipsia. Pertinent negatives for diabetes include no chest pain, no fatigue, no polyphagia, no polyuria and no weakness. Patient has a history of type 2 diabetes mellitus not on insulin, hypothyroidism, hypertension, hyperlipidemia. She is very intolerant to generic medications including levothyroxine. She felt best when she was on [place] Thyroid plus Cytomel. She was taken off Cytomel by endocrinologist and  [place] Thyroid  was restarted. .        She is on pravastatin 20 mg at bedtime, Bydureon 2 mg subcu weekly, [place] thyroid 90 mg daily, telmisartan  80 mg daily, Metformin 1000 mg twice a day with meals and metoprolol 100 mg daily. She also takes hydrochlorothiazide/triamterene 50/75 once a day. She is on glimepiride 2 mg tablets twice daily. She did not get her blood work done because she does not have insurance that covers blood work at the hospital     She did not check her blood sugars because she was unable to obtain blood sugar strips. She says she knows her sugars are high because she has not been watching her diet and she is extremely thirsty. She is under a lot of stress because her son was diagnosed with liver cancer and is only 27years old. He lives with them. She is under a lot of stress and she is anxiety problems. She was given for alprazolam years ago which she took very rarely. She would like to proceed for alprazolam.  She is well aware of the addictive potential of the medications and its side effects. She is willing to sign a medication agreement. We will give her alprazolam 0.251 a day #30 with no refills.   OARRS report reviewed and she is on no problematic medications. Screening mammogram showed no mammographic evidence of malignancy. Her mammogram. Will be due In July 2022. She would like to defer this until she reaches Medicare age in about 10 months along with all her other labs and recommended imaging studies. She will be referred for creening colonoscopy  with Deyanira Thacker she was unable to see them and wants to wait until she is Medicare age. she had her diabetic eye examination done with Dr. Georgina Hagen in December 2021. Ultrasound of her thyroid showed a multinodular goiter    She defers a pelvic and Pap test.    Screening for depression was done and her PHQ 2 was 0      She had her Covid vaccines in March 2021. She had her Covid vaccine booster in December 2021. She needs her flu shot. She had her Tdap . She will see how much  Shingrix vaccine costs    Blood work kidney January 2022 showed hemoglobin A1c was 10.6%. This was before she got her Bydureon prescription . Fasting blood sugar was. 394 estimated GFR was 51.1. Serum sodium is low at 129 which is secondary to elevated blood sugar. Lipids and thyroid blood work were normal      Review of Systems   Constitutional:  Negative for appetite change, fatigue and unexpected weight change. HENT:  Negative for congestion, ear pain, facial swelling, rhinorrhea, sore throat, tinnitus and trouble swallowing. Eyes:  Negative for photophobia, pain, discharge, itching and visual disturbance. Respiratory:  Negative for cough, shortness of breath, wheezing and stridor. Cardiovascular:  Negative for chest pain, palpitations and leg swelling. Gastrointestinal:  Negative for abdominal distention, abdominal pain, anal bleeding, blood in stool, constipation, diarrhea, nausea and vomiting. Endocrine: Positive for polydipsia. Negative for cold intolerance, heat intolerance, polyphagia and polyuria.    Genitourinary:  Negative for difficulty urinating, dysuria, flank pain, frequency, hematuria and urgency. Musculoskeletal:  Negative for arthralgias, gait problem, joint swelling and myalgias. Skin:  Negative for color change, pallor and rash. Allergic/Immunologic: Negative for environmental allergies and food allergies. Neurological:  Negative for dizziness, tremors, seizures, syncope, speech difficulty, weakness, light-headedness, numbness and headaches. Hematological:  Negative for adenopathy. Does not bruise/bleed easily. Psychiatric/Behavioral:  Negative for agitation, behavioral problems, confusion, sleep disturbance and suicidal ideas. The patient is not nervous/anxious. Anxiety        Current Outpatient Medications:     blood glucose monitor strips, Test 1 times a day & as needed for symptoms of irregular blood glucose. Dispense sufficient amount for indicated testing frequency plus additional to accommodate PRN testing needs. Contour, Disp: 100 strip, Rfl: 3    metFORMIN (GLUCOPHAGE-XR) 500 MG extended release tablet, TAKE TWO TABLETS BY MOUTH TWO TIMES A DAY, Disp: 120 tablet, Rfl: 5    ALPRAZolam (XANAX) 0.25 MG tablet, Take 1 tablet by mouth nightly as needed for Anxiety for up to 30 days. , Disp: 30 tablet, Rfl: 0    glipiZIDE (GLUCOTROL) 10 MG tablet, Take 1 tablet by mouth in the morning and 1 tablet before bedtime. , Disp: 60 tablet, Rfl: 3    pravastatin (PRAVACHOL) 20 MG tablet, Take 1 tablet by mouth daily, Disp: 30 tablet, Rfl: 5    Exenatide ER (BYDUREON BCISE) 2 MG/0.85ML AUIJ, INJECT 1 PEN INTO THE SKIN ONCE A WEEK., Disp: 3.4 mL, Rfl: 5    glucose monitoring (FREESTYLE FREEDOM) kit, 1 kit by Does not apply route daily Contour brand, Disp: 1 kit, Rfl: 0    Multiple Vitamins-Minerals (MULTIVITAMIN GUMMIES ADULTS PO), Take by mouth, Disp: , Rfl:     Cholecalciferol (VITAMIN D3) 125 MCG (5000 UT) TABS, Take by mouth, Disp: , Rfl:     Biotin 10 MG tablet, Take 10 mg by mouth daily, Disp: , Rfl:     metoprolol succinate (TOPROL XL) 100 MG extended release tablet, Take 1 tablet by mouth daily, Disp: 30 tablet, Rfl: 11    telmisartan (MICARDIS) 80 MG tablet, Take 1 tablet by mouth daily, Disp: 30 tablet, Rfl: 11    thyroid (ARMOUR THYROID) 90 MG tablet, Take 1 tablet by mouth daily, Disp: 30 tablet, Rfl: 11    triamterene-hydroCHLOROthiazide (MAXZIDE) 75-50 MG per tablet, Take 1 tablet by mouth daily, Disp: 30 tablet, Rfl: 11    ibuprofen (ADVIL;MOTRIN) 800 MG tablet, TAKE ONE TABLET BY MOUTH EVERY 4 HOURS AS NEEDED, Disp: , Rfl:      No Known Allergies     Past Medical History:   Diagnosis Date    Acquired hypothyroidism 2019    Essential hypertension 2019    Mixed hyperlipidemia 2019    Type 2 diabetes mellitus with hyperglycemia, without long-term current use of insulin ([facility] [address] 2019       Health Maintenance Due   Topic Date Due    Diabetic foot exam  Never done    Colorectal Cancer Screen  2018    Depression Screen  2022        Patient Active Problem List   Diagnosis    Type 2 diabetes mellitus with hyperglycemia, without long-term current use of insulin (HCC)    Essential hypertension    Acquired hypothyroidism    Mixed hyperlipidemia    Right upper quadrant abdominal pain    Goiter    Multinodular goiter    Stage 3a chronic kidney disease ([facility] [address]    Anxiety        Past Surgical History:   Procedure Laterality Date    APPENDECTOMY       SECTION      X3    CHOLECYSTECTOMY      COCCYX REMOVAL      WISDOM TOOTH EXTRACTION          Family History   Problem Relation Age of Onset    Heart Disease Father         Social History     Tobacco Use    Smoking status: Never    Smokeless tobacco: Never   Substance Use Topics    Alcohol use: Never    Drug use: Never        Objective  Vitals:    22 0803   BP: 126/82   Pulse: 81   Temp: 97.3 °F (36.3 °C)   SpO2: 96%   Weight: 194 lb (88 kg)   Height: 5' 6\" (1.676 m)        Exam:  Physical Exam  Vitals reviewed. Constitutional:       General: She is not in acute distress. Appearance: She is well-developed. She is obese. She is not ill-appearing. HENT:      Head: Normocephalic. Right Ear: External ear normal.      Left Ear: External ear normal.   Eyes:      General: No scleral icterus. Right eye: No discharge. Left eye: No discharge. Conjunctiva/sclera: Conjunctivae normal.      Pupils: Pupils are equal, round, and reactive to light. Neck:      Thyroid: No thyromegaly. Comments: Thyroid slightly enlarged and nodular  Cardiovascular:      Rate and Rhythm: Normal rate and regular rhythm. Heart sounds: Normal heart sounds. No murmur heard. No friction rub. No gallop. Pulmonary:      Effort: Pulmonary effort is normal. No respiratory distress. Breath sounds: Normal breath sounds. No wheezing or rales. Chest:      Chest wall: No tenderness. Abdominal:      General: Bowel sounds are normal. There is no distension. Palpations: Abdomen is soft. Tenderness: There is no guarding or rebound. Musculoskeletal:         General: No tenderness or deformity. Normal range of motion. Cervical back: Normal range of motion and neck supple. Lymphadenopathy:      Cervical: No cervical adenopathy. Skin:     General: Skin is warm and dry. Coloration: Skin is not pale. Findings: No erythema or rash. Neurological:      Mental Status: She is alert and oriented to person, place, and time. Cranial Nerves: No cranial nerve deficit. Sensory: No sensory deficit. Deep Tendon Reflexes: Reflexes normal.   Psychiatric:         Mood and Affect: Mood normal.         Behavior: Behavior normal.         Thought Content: Thought content normal.         Judgment: Judgment normal.        Last labs reviewed. ASSESSMENT & PLAN :   Problem List          Circulatory    Essential hypertension       Blood pressures are stable. Continue medications and monitor blood pressures at home.  Call office if systolics are over 939 over diastolics over 90. Relevant Medications    metoprolol succinate (TOPROL XL) 100 MG extended release tablet    telmisartan (MICARDIS) 80 MG tablet    triamterene-hydroCHLOROthiazide (MAXZIDE) 75-50 MG per tablet       Endocrine    Type 2 diabetes mellitus with hyperglycemia, without long-term current use of insulin ([facility]) - Primary     Globin  POCT A1c was high at 13.4%. She is aware that she is not watching his her diet as carefully as she should. She is under a lot of stress which would increase her blood sugars. She does not think her glimepiride is working. We will switch her to glipizide 10 mg twice daily with meals, continue her Bydureon 2 mg weekly subcu, metformin which she takes 500 mg in the morning and 1500 mg at night. Prescription for blood sugar strips and she is to monitor her fasting blood sugar at least 3 times a week. Watch her diet more carefully. Let me know if her fasting blood sugars stay over 150         Relevant Medications    Exenatide ER (BYDUREON BCISE) 2 MG/0.85ML AUIJ    glucose monitoring (FREESTYLE FREEDOM) kit    blood glucose monitor strips    metFORMIN (GLUCOPHAGE-XR) 500 MG extended release tablet    glipiZIDE (GLUCOTROL) 10 MG tablet    Other Relevant Orders    POCT glycosylated hemoglobin (Hb A1C) (Completed)    Acquired hypothyroidism       check TSH and free T4 when she turns Medicare age. In the meantime continue her [place] Thyroid 90 mg daily         Relevant Medications    thyroid (ARMOUR THYROID) 90 MG tablet       Genitourinary    Stage 3a chronic kidney disease ([facility] [address]       avoid NSAID's and per her request will check CMP after she is Medicare age            Other    Anxiety       OARRS report reviewed. Medication contract signed.   Prescription for alprazolam 0.25 mg at night as needed anxiety #30 with no refills         Relevant Medications    ALPRAZolam (XANAX) 0.25 MG tablet    Mixed hyperlipidemia     Watch saturated fats in diet and will monitor lipids after age 70         Relevant Medications    metoprolol succinate (TOPROL XL) 100 MG extended release tablet    telmisartan (MICARDIS) 80 MG tablet    triamterene-hydroCHLOROthiazide (MAXZIDE) 75-50 MG per tablet    pravastatin (PRAVACHOL) 20 MG tablet        Return in about 3 months (around 10/18/2022), or DM, HTN.        Jenifer Jin,   7/18/2022

## 2022-11-10 LAB — DIABETIC RETINOPATHY: NEGATIVE

## 2022-12-05 ENCOUNTER — OFFICE VISIT (OUTPATIENT)
Dept: PRIMARY CARE CLINIC | Age: 62
End: 2022-12-05
Payer: COMMERCIAL

## 2022-12-05 ENCOUNTER — TELEPHONE (OUTPATIENT)
Dept: PRIMARY CARE CLINIC | Age: 62
End: 2022-12-05

## 2022-12-05 VITALS
DIASTOLIC BLOOD PRESSURE: 120 MMHG | WEIGHT: 184.4 LBS | BODY MASS INDEX: 29.63 KG/M2 | RESPIRATION RATE: 18 BRPM | HEART RATE: 85 BPM | SYSTOLIC BLOOD PRESSURE: 200 MMHG | OXYGEN SATURATION: 96 % | TEMPERATURE: 97.4 F | HEIGHT: 66 IN

## 2022-12-05 DIAGNOSIS — E03.9 ACQUIRED HYPOTHYROIDISM: ICD-10-CM

## 2022-12-05 DIAGNOSIS — E11.65 TYPE 2 DIABETES MELLITUS WITH HYPERGLYCEMIA, WITHOUT LONG-TERM CURRENT USE OF INSULIN (HCC): ICD-10-CM

## 2022-12-05 DIAGNOSIS — I10 UNCONTROLLED HYPERTENSION: Primary | ICD-10-CM

## 2022-12-05 PROBLEM — Z78.9 NO KNOWN PROBLEMS: Status: ACTIVE | Noted: 2022-12-05

## 2022-12-05 LAB — HBA1C MFR BLD: 11.2 %

## 2022-12-05 PROCEDURE — 3078F DIAST BP <80 MM HG: CPT | Performed by: INTERNAL MEDICINE

## 2022-12-05 PROCEDURE — 99214 OFFICE O/P EST MOD 30 MIN: CPT | Performed by: INTERNAL MEDICINE

## 2022-12-05 PROCEDURE — 3074F SYST BP LT 130 MM HG: CPT | Performed by: INTERNAL MEDICINE

## 2022-12-05 PROCEDURE — 83036 HEMOGLOBIN GLYCOSYLATED A1C: CPT | Performed by: INTERNAL MEDICINE

## 2022-12-05 PROCEDURE — 3046F HEMOGLOBIN A1C LEVEL >9.0%: CPT | Performed by: INTERNAL MEDICINE

## 2022-12-05 RX ORDER — LEVOTHYROXINE AND LIOTHYRONINE 57; 13.5 UG/1; UG/1
90 TABLET ORAL DAILY
Qty: 30 TABLET | Refills: 11 | Status: SHIPPED | OUTPATIENT
Start: 2022-12-05

## 2022-12-05 RX ORDER — TELMISARTAN 80 MG/1
80 TABLET ORAL DAILY
Qty: 30 TABLET | Refills: 11 | Status: SHIPPED | OUTPATIENT
Start: 2022-12-05

## 2022-12-05 RX ORDER — SEMAGLUTIDE 1.34 MG/ML
1 INJECTION, SOLUTION SUBCUTANEOUS WEEKLY
Qty: 4 ADJUSTABLE DOSE PRE-FILLED PEN SYRINGE | Refills: 5 | Status: SHIPPED | OUTPATIENT
Start: 2022-12-05

## 2022-12-05 RX ORDER — METOPROLOL SUCCINATE 100 MG/1
100 TABLET, EXTENDED RELEASE ORAL DAILY
Qty: 30 TABLET | Refills: 11 | Status: SHIPPED | OUTPATIENT
Start: 2022-12-05

## 2022-12-05 RX ORDER — METFORMIN HYDROCHLORIDE 500 MG/1
TABLET, EXTENDED RELEASE ORAL
Qty: 120 TABLET | Refills: 5 | Status: SHIPPED | OUTPATIENT
Start: 2022-12-05

## 2022-12-05 ASSESSMENT — ENCOUNTER SYMPTOMS
STRIDOR: 0
SORE THROAT: 0
TROUBLE SWALLOWING: 0
COUGH: 0
COLOR CHANGE: 0
ABDOMINAL DISTENTION: 0
SHORTNESS OF BREATH: 0
EYE DISCHARGE: 0
NAUSEA: 0
FACIAL SWELLING: 0
DIARRHEA: 0
ANAL BLEEDING: 0
BLOOD IN STOOL: 0
PHOTOPHOBIA: 0
WHEEZING: 0
EYE ITCHING: 0
RHINORRHEA: 0
EYE PAIN: 0
VOMITING: 0
ABDOMINAL PAIN: 0
CONSTIPATION: 0

## 2022-12-05 NOTE — ASSESSMENT & PLAN NOTE
not at goal.  She is to take her telmisartan daily and not skip the medication but if she does not have it she is to call the office so we can get her prescription sent over. Continue her metoprolol and triamterene hydrochlorothiazide.   When she becomes Medicare then she will agree to get a CMP

## 2022-12-05 NOTE — ASSESSMENT & PLAN NOTE
Not at goal.  Check hemoglobin A1c today and it was a little better at 11.2%. I do not think the Bydureon is working well supported discontinue that and put her on Trulicity 1 mg subcu weekly. She is not to discontinue the Bydureon until she gets her Trulicity pens. In the meantime she will continue her glipizide 10 mg twice daily metformin 500 mg twice daily. unable to monitor her blood sugars because of expense but as soon as she turns Medicare she will do so.   Watch her diet carefully

## 2022-12-05 NOTE — PROGRESS NOTES
2022    Name: Blanca Smith : 1960 Sex: female  Age: 57 y.o. Subjective:  Chief Complaint   Patient presents with    Diabetes    Hypertension        Diabetes  Pertinent negatives for hypoglycemia include no confusion, dizziness, headaches, nervousness/anxiousness, pallor, seizures, speech difficulty or tremors. Associated symptoms include polydipsia. Pertinent negatives for diabetes include no chest pain, no fatigue, no polyphagia, no polyuria and no weakness. Hypertension  Pertinent negatives include no chest pain, headaches, palpitations or shortness of breath. Patient was diagnosed with COVID infection about 13 days ago. She says she felt good just had a slight cold but everybody in her family with exception of her son had Tena. She is up-to-date on her immunizations. She quarantined in a different home and she misplaced her telmisartan bottle. She has been off of it for about a week and her blood pressure today is 200/120. I told her to restart her telmisartan, I will send over a prescription listed as possible. She is also on metoprolol succinate 100 mg a day, triamterene hydrochlorothiazide 75/50 once a day    Patient has a history of type 2 diabetes mellitus not on insulin, hypothyroidism, hypertension, hyperlipidemia. Her last hemoglobin A1c in 2022 was 13.4. She is under the care of Dr. Val Edge but has not been able to see him because he is not on her insurance list.    She is very intolerant to generic medications including levothyroxine. She felt best when she was on [place] Thyroid plus Cytomel. She was taken off Cytomel by endocrinologist and  [place] Thyroid  was restarted. .  TSH and free T4 in 2022 were normal.  She is unable to get those done because of cost.  She will get them done after she turns Medicare age in 2023.         She is on pravastatin 20 mg at bedtime, Bydureon 2 mg subcu weekly, [place] thyroid 90 mg daily, telmisartan  80 mg daily, Metformin 1000 mg twice a day with meals and metoprolol 100 mg daily. She also takes hydrochlorothiazide/triamterene 50/75 once a day. She is on glimepiride 2 mg tablets twice daily. She did not get her blood work done because she does not have insurance that covers blood work at the hospital     She did not check her blood sugars because she was unable to obtain blood sugar strips. She says she knows her sugars are high because she has not been watching her diet and she is extremely thirsty. She is under a lot of stress because her son was diagnosed with liver cancer and is only 27years old. He lives with them. She is under a lot of stress and she is anxiety problems. She was given for alprazolam years ago which she took very rarely. She would like to proceed for alprazolam.  She is well aware of the addictive potential of the medications and its side effects. She is willing to sign a medication agreement. We will give her alprazolam 0.251 a day #30 with no refills. OARRS report reviewed and she is on no problematic medications. Screening mammogram showed no mammographic evidence of malignancy. Her mammogram. Will be due In July 2022. She would like to defer this until she reaches Medicare age in about 10 months along with all her other labs and recommended imaging studies. She will be referred for screening colonoscopy  with Yamilet Zafar she was unable to see them and wants to wait until she is Medicare age. she had her diabetic eye examination done with Dr. Danne Boast in December 2021. Ultrasound of her thyroid showed a multinodular goiter    She defers a pelvic and Pap test.      She had her Covid vaccines in March 2021. She had her Covid vaccine booster in December 2021. She refuses to get her flu shot. She had her Tdap . She will see how much  Shingrix vaccine costs    Blood work kidney January 2022 showed hemoglobin A1c was 10.6%. This was before she got her Bydureon prescription . Fasting blood sugar was. 394 estimated GFR was 51.1. Serum sodium is low at 129 which is secondary to elevated blood sugar. Lipids and thyroid blood work were normal      Review of Systems   Constitutional:  Negative for appetite change, fatigue and unexpected weight change. HENT:  Negative for congestion, ear pain, facial swelling, rhinorrhea, sore throat, tinnitus and trouble swallowing. Eyes:  Negative for photophobia, pain, discharge, itching and visual disturbance. Respiratory:  Negative for cough, shortness of breath, wheezing and stridor. Cardiovascular:  Negative for chest pain, palpitations and leg swelling. Gastrointestinal:  Negative for abdominal distention, abdominal pain, anal bleeding, blood in stool, constipation, diarrhea, nausea and vomiting. Endocrine: Positive for polydipsia. Negative for cold intolerance, heat intolerance, polyphagia and polyuria. Genitourinary:  Negative for difficulty urinating, dysuria, flank pain, frequency, hematuria and urgency. Musculoskeletal:  Negative for arthralgias, gait problem, joint swelling and myalgias. Skin:  Negative for color change, pallor and rash. Allergic/Immunologic: Negative for environmental allergies and food allergies. Neurological:  Negative for dizziness, tremors, seizures, syncope, speech difficulty, weakness, light-headedness, numbness and headaches. Hematological:  Negative for adenopathy. Does not bruise/bleed easily. Psychiatric/Behavioral:  Negative for agitation, behavioral problems, confusion, sleep disturbance and suicidal ideas. The patient is not nervous/anxious.          Anxiety        Current Outpatient Medications:     thyroid (ARMOUR THYROID) 90 MG tablet, Take 1 tablet by mouth daily, Disp: 30 tablet, Rfl: 11    telmisartan (MICARDIS) 80 MG tablet, Take 1 tablet by mouth daily, Disp: 30 tablet, Rfl: 11    metoprolol succinate (TOPROL XL) 100 MG extended release tablet, Take 1 tablet by mouth daily, Disp: 30 tablet, Rfl: 11    metFORMIN (GLUCOPHAGE-XR) 500 MG extended release tablet, TAKE TWO TABLETS BY MOUTH TWO TIMES A DAY, Disp: 120 tablet, Rfl: 5    Semaglutide, 1 MG/DOSE, (OZEMPIC, 1 MG/DOSE,) 2 MG/1.5ML SOPN, Inject 1 mg into the skin once a week, Disp: 4 Adjustable Dose Pre-filled Pen Syringe, Rfl: 5    blood glucose monitor strips, Test 1 times a day & as needed for symptoms of irregular blood glucose. Dispense sufficient amount for indicated testing frequency plus additional to accommodate PRN testing needs. Contour, Disp: 100 strip, Rfl: 3    glipiZIDE (GLUCOTROL) 10 MG tablet, Take 1 tablet by mouth in the morning and 1 tablet before bedtime. , Disp: 60 tablet, Rfl: 3    pravastatin (PRAVACHOL) 20 MG tablet, Take 1 tablet by mouth daily, Disp: 30 tablet, Rfl: 5    glucose monitoring (FREESTYLE FREEDOM) kit, 1 kit by Does not apply route daily Contour brand, Disp: 1 kit, Rfl: 0    Multiple Vitamins-Minerals (MULTIVITAMIN GUMMIES ADULTS PO), Take by mouth, Disp: , Rfl:     Cholecalciferol (VITAMIN D3) 125 MCG (5000 UT) TABS, Take by mouth, Disp: , Rfl:     Biotin 10 MG tablet, Take 10 mg by mouth daily, Disp: , Rfl:     triamterene-hydroCHLOROthiazide (MAXZIDE) 75-50 MG per tablet, Take 1 tablet by mouth daily, Disp: 30 tablet, Rfl: 11    ibuprofen (ADVIL;MOTRIN) 800 MG tablet, TAKE ONE TABLET BY MOUTH EVERY 4 HOURS AS NEEDED, Disp: , Rfl:      No Known Allergies     Past Medical History:   Diagnosis Date    Acquired hypothyroidism 11/14/2019    Essential hypertension 11/14/2019    Mixed hyperlipidemia 11/14/2019    Type 2 diabetes mellitus with hyperglycemia, without long-term current use of insulin ([facility] [address] 11/14/2019       Health Maintenance Due   Topic Date Due    Diabetic foot exam  Never done    Colorectal Cancer Screen  11/04/2018    Diabetic retinal exam  12/20/2022    Lipids  01/03/2023        Patient Active Problem List   Diagnosis    Type 2 diabetes mellitus with hyperglycemia, without long-term current use of insulin (HCC)    Essential hypertension    Acquired hypothyroidism    Mixed hyperlipidemia    Right upper quadrant abdominal pain    Goiter    Multinodular goiter    Stage 3a chronic kidney disease (HCC)    Anxiety    No known problems    Uncontrolled hypertension        Past Surgical History:   Procedure Laterality Date    APPENDECTOMY       SECTION      X3    CHOLECYSTECTOMY      COCCYX REMOVAL      WISDOM TOOTH EXTRACTION          Family History   Problem Relation Age of Onset    Heart Disease Father         Social History     Tobacco Use    Smoking status: Never    Smokeless tobacco: Never   Substance Use Topics    Alcohol use: Never    Drug use: Never        Objective  Vitals:    22 1207 22 1249   BP: (!) 180/120 (!) 200/120   Pulse: 85    Resp: 18    Temp: 97.4 °F (36.3 °C)    SpO2: 96%    Weight: 184 lb 6.4 oz (83.6 kg)    Height: 5' 6\" (1.676 m)         Exam:  Physical Exam  Vitals reviewed. Constitutional:       General: She is not in acute distress. Appearance: She is well-developed. She is obese. She is not ill-appearing. HENT:      Head: Normocephalic. Right Ear: External ear normal.      Left Ear: External ear normal.   Eyes:      General: No scleral icterus. Right eye: No discharge. Left eye: No discharge. Conjunctiva/sclera: Conjunctivae normal.      Pupils: Pupils are equal, round, and reactive to light. Neck:      Thyroid: No thyromegaly. Comments: Thyroid slightly enlarged and nodular  Cardiovascular:      Rate and Rhythm: Normal rate and regular rhythm. Heart sounds: Normal heart sounds. No murmur heard. No friction rub. No gallop. Pulmonary:      Effort: Pulmonary effort is normal. No respiratory distress. Breath sounds: Normal breath sounds. No wheezing or rales. Chest:      Chest wall: No tenderness. Abdominal:      General: Bowel sounds are normal. There is no distension.       Palpations: Abdomen is tablet    metFORMIN (GLUCOPHAGE-XR) 500 MG extended release tablet    Semaglutide, 1 MG/DOSE, (OZEMPIC, 1 MG/DOSE,) 2 MG/1.5ML SOPN    Other Relevant Orders    POCT glycosylated hemoglobin (Hb A1C) (Completed)    Acquired hypothyroidism       thyroid blood work acceptable. Continue her [place] Thyroid as she is intolerant to levothyroxine and other other thyroid medications         Relevant Medications    thyroid (ARMOUR THYROID) 90 MG tablet        Return Welcome to Medicare, Reg ov- DM HTN be fasting.   In March 2023      Maria Luisa Anthony DO  12/5/2022

## 2022-12-20 ENCOUNTER — OFFICE VISIT (OUTPATIENT)
Dept: PRIMARY CARE CLINIC | Age: 62
End: 2022-12-20
Payer: COMMERCIAL

## 2022-12-20 VITALS
WEIGHT: 184 LBS | HEIGHT: 66 IN | OXYGEN SATURATION: 98 % | BODY MASS INDEX: 29.57 KG/M2 | HEART RATE: 84 BPM | DIASTOLIC BLOOD PRESSURE: 86 MMHG | SYSTOLIC BLOOD PRESSURE: 132 MMHG | TEMPERATURE: 97.8 F

## 2022-12-20 DIAGNOSIS — Z48.02 ENCOUNTER FOR REMOVAL OF SUTURES: ICD-10-CM

## 2022-12-20 DIAGNOSIS — E11.65 TYPE 2 DIABETES MELLITUS WITH HYPERGLYCEMIA, WITHOUT LONG-TERM CURRENT USE OF INSULIN (HCC): ICD-10-CM

## 2022-12-20 DIAGNOSIS — S61.411D LACERATION OF RIGHT HAND WITHOUT FOREIGN BODY, SUBSEQUENT ENCOUNTER: Primary | ICD-10-CM

## 2022-12-20 PROBLEM — S61.419A HAND LACERATION: Status: ACTIVE | Noted: 2022-12-20

## 2022-12-20 PROBLEM — S61.411A LACERATION OF RIGHT HAND WITHOUT FOREIGN BODY: Status: ACTIVE | Noted: 2022-12-20

## 2022-12-20 PROCEDURE — 3046F HEMOGLOBIN A1C LEVEL >9.0%: CPT | Performed by: INTERNAL MEDICINE

## 2022-12-20 PROCEDURE — 3078F DIAST BP <80 MM HG: CPT | Performed by: INTERNAL MEDICINE

## 2022-12-20 PROCEDURE — 3074F SYST BP LT 130 MM HG: CPT | Performed by: INTERNAL MEDICINE

## 2022-12-20 PROCEDURE — 99213 OFFICE O/P EST LOW 20 MIN: CPT | Performed by: INTERNAL MEDICINE

## 2022-12-20 RX ORDER — SEMAGLUTIDE 1.34 MG/ML
1 INJECTION, SOLUTION SUBCUTANEOUS WEEKLY
Qty: 4 ADJUSTABLE DOSE PRE-FILLED PEN SYRINGE | Refills: 5 | Status: SHIPPED | OUTPATIENT
Start: 2022-12-20

## 2022-12-20 SDOH — ECONOMIC STABILITY: FOOD INSECURITY: WITHIN THE PAST 12 MONTHS, YOU WORRIED THAT YOUR FOOD WOULD RUN OUT BEFORE YOU GOT MONEY TO BUY MORE.: NEVER TRUE

## 2022-12-20 SDOH — ECONOMIC STABILITY: FOOD INSECURITY: WITHIN THE PAST 12 MONTHS, THE FOOD YOU BOUGHT JUST DIDN'T LAST AND YOU DIDN'T HAVE MONEY TO GET MORE.: NEVER TRUE

## 2022-12-20 ASSESSMENT — ENCOUNTER SYMPTOMS
EYE DISCHARGE: 0
PHOTOPHOBIA: 0
ABDOMINAL DISTENTION: 0
STRIDOR: 0
CONSTIPATION: 0
FACIAL SWELLING: 0
CHEST TIGHTNESS: 0
ABDOMINAL PAIN: 0
COUGH: 0
BLOOD IN STOOL: 0
ROS SKIN COMMENTS: LACERATION RIGHT HAND
VOMITING: 0
SORE THROAT: 0
SHORTNESS OF BREATH: 0
COLOR CHANGE: 0
DIARRHEA: 0
TROUBLE SWALLOWING: 0
EYE PAIN: 0
WHEEZING: 0
EYE ITCHING: 0
ANAL BLEEDING: 0
NAUSEA: 0
RHINORRHEA: 0

## 2022-12-20 ASSESSMENT — SOCIAL DETERMINANTS OF HEALTH (SDOH): HOW HARD IS IT FOR YOU TO PAY FOR THE VERY BASICS LIKE FOOD, HOUSING, MEDICAL CARE, AND HEATING?: NOT HARD AT ALL

## 2022-12-20 NOTE — PROGRESS NOTES
2022    Name: Blanca Tobar : 1960 Sex: female  Age: 57 y.o. Subjective:  Chief Complaint   Patient presents with    ED Follow-up        Diabetes  Pertinent negatives for hypoglycemia include no confusion, dizziness, headaches, nervousness/anxiousness, pallor, seizures, speech difficulty or tremors. Associated symptoms include polydipsia. Pertinent negatives for diabetes include no chest pain, no fatigue, no polyphagia, no polyuria and no weakness. Hypertension  Pertinent negatives include no chest pain, headaches, palpitations or shortness of breath. Patient was diagnosed with COVID infection about 13 days ago. She says she felt good just had a slight cold but everybody in her family with exception of her son had [employer]. She is up-to-date on her immunizations. She quarantined in a different home and she misplaced her telmisartan bottle. She has been off of it for about a week and her blood pressure today is 200/120. I told her to restart her telmisartan, I will send over a prescription listed as possible. She is also on metoprolol succinate 100 mg a day, triamterene hydrochlorothiazide 75/50 once a day    Patient has a history of type 2 diabetes mellitus not on insulin, hypothyroidism, hypertension, hyperlipidemia. Her last hemoglobin A1c in 2022 was 13.4. She is under the care of Dr. Manny Santamaria but has not been able to see him because he is not on her insurance list.    She is very intolerant to generic medications including levothyroxine. She felt best when she was on [place] Thyroid plus Cytomel. She was taken off Cytomel by endocrinologist and  [place] Thyroid  was restarted. .  TSH and free T4 in 2022 were normal.  She is unable to get those done because of cost.  She will get them done after she turns Medicare age in 2023.         She is on pravastatin 20 mg at bedtime, Bydureon 2 mg subcu weekly, [place] thyroid 90 mg daily, telmisartan  80 mg daily, Metformin 1000 mg twice a day with meals and metoprolol 100 mg daily. She also takes hydrochlorothiazide/triamterene 50/75 once a day. She is on glimepiride 2 mg tablets twice daily. She did not get her blood work done because she does not have insurance that covers blood work at the hospital     She did not check her blood sugars because she was unable to obtain blood sugar strips. She says she knows her sugars are high because she has not been watching her diet and she is extremely thirsty. She is under a lot of stress because her son was diagnosed with liver cancer and is only 27years old. He lives with them. She is under a lot of stress and she is anxiety problems. She was given for alprazolam years ago which she took very rarely. She would like to proceed for alprazolam.  She is well aware of the addictive potential of the medications and its side effects. She is willing to sign a medication agreement. We will give her alprazolam 0.251 a day #30 with no refills. OARRS report reviewed and she is on no problematic medications. Screening mammogram showed no mammographic evidence of malignancy. Her mammogram. Will be due In July 2022. She would like to defer this until she reaches Medicare age in about 10 months along with all her other labs and recommended imaging studies. She will be referred for screening colonoscopy  with Christene Boxer she was unable to see them and wants to wait until she is Medicare age. she had her diabetic eye examination done with Dr. Murali Xiong in December 2021. Ultrasound of her thyroid showed a multinodular goiter    She defers a pelvic and Pap test.      She had her Covid vaccines in March 2021. She had her Covid vaccine booster in December 2021. She refuses to get her flu shot. She had her Tdap . She will see how much  Shingrix vaccine costs    Blood work kidney January 2022 showed hemoglobin A1c was 10.6%. This was before she got her Bydureon prescription .   Fasting blood sugar was. 394 estimated GFR was 51.1. Serum sodium is low at 129 which is secondary to elevated blood sugar. Lipids and thyroid blood work were normal      Review of Systems   Constitutional:  Negative for appetite change, fatigue and unexpected weight change. HENT:  Negative for congestion, ear pain, facial swelling, rhinorrhea, sore throat, tinnitus and trouble swallowing. Eyes:  Negative for photophobia, pain, discharge, itching and visual disturbance. Respiratory:  Negative for cough, shortness of breath, wheezing and stridor. Cardiovascular:  Negative for chest pain, palpitations and leg swelling. Gastrointestinal:  Negative for abdominal distention, abdominal pain, anal bleeding, blood in stool, constipation, diarrhea, nausea and vomiting. Endocrine: Positive for polydipsia. Negative for cold intolerance, heat intolerance, polyphagia and polyuria. Genitourinary:  Negative for difficulty urinating, dysuria, flank pain, frequency, hematuria and urgency. Musculoskeletal:  Negative for arthralgias, gait problem, joint swelling and myalgias. Skin:  Negative for color change, pallor and rash. Allergic/Immunologic: Negative for environmental allergies and food allergies. Neurological:  Negative for dizziness, tremors, seizures, syncope, speech difficulty, weakness, light-headedness, numbness and headaches. Hematological:  Negative for adenopathy. Does not bruise/bleed easily. Psychiatric/Behavioral:  Negative for agitation, behavioral problems, confusion, sleep disturbance and suicidal ideas. The patient is not nervous/anxious.          Anxiety        Current Outpatient Medications:     thyroid (ARMOUR THYROID) 90 MG tablet, Take 1 tablet by mouth daily, Disp: 30 tablet, Rfl: 11    telmisartan (MICARDIS) 80 MG tablet, Take 1 tablet by mouth daily, Disp: 30 tablet, Rfl: 11    metoprolol succinate (TOPROL XL) 100 MG extended release tablet, Take 1 tablet by mouth daily, Disp: 30 tablet, Rfl: 11    metFORMIN (GLUCOPHAGE-XR) 500 MG extended release tablet, TAKE TWO TABLETS BY MOUTH TWO TIMES A DAY, Disp: 120 tablet, Rfl: 5    Semaglutide, 1 MG/DOSE, (OZEMPIC, 1 MG/DOSE,) 2 MG/1.5ML SOPN, Inject 1 mg into the skin once a week, Disp: 4 Adjustable Dose Pre-filled Pen Syringe, Rfl: 5    blood glucose monitor strips, Test 1 times a day & as needed for symptoms of irregular blood glucose. Dispense sufficient amount for indicated testing frequency plus additional to accommodate PRN testing needs. Contour, Disp: 100 strip, Rfl: 3    glipiZIDE (GLUCOTROL) 10 MG tablet, Take 1 tablet by mouth in the morning and 1 tablet before bedtime. , Disp: 60 tablet, Rfl: 3    pravastatin (PRAVACHOL) 20 MG tablet, Take 1 tablet by mouth daily, Disp: 30 tablet, Rfl: 5    glucose monitoring (FREESTYLE FREEDOM) kit, 1 kit by Does not apply route daily Contour brand, Disp: 1 kit, Rfl: 0    Multiple Vitamins-Minerals (MULTIVITAMIN GUMMIES ADULTS PO), Take by mouth, Disp: , Rfl:     Cholecalciferol (VITAMIN D3) 125 MCG (5000 UT) TABS, Take by mouth, Disp: , Rfl:     Biotin 10 MG tablet, Take 10 mg by mouth daily, Disp: , Rfl:     triamterene-hydroCHLOROthiazide (MAXZIDE) 75-50 MG per tablet, Take 1 tablet by mouth daily, Disp: 30 tablet, Rfl: 11    ibuprofen (ADVIL;MOTRIN) 800 MG tablet, TAKE ONE TABLET BY MOUTH EVERY 4 HOURS AS NEEDED, Disp: , Rfl:      No Known Allergies     Past Medical History:   Diagnosis Date    Acquired hypothyroidism 11/14/2019    Essential hypertension 11/14/2019    Mixed hyperlipidemia 11/14/2019    Type 2 diabetes mellitus with hyperglycemia, without long-term current use of insulin ([address] 11/14/2019       Health Maintenance Due   Topic Date Due    Diabetic foot exam  Never done    Colorectal Cancer Screen  11/04/2018    Lipids  01/03/2023        Patient Active Problem List   Diagnosis    Type 2 diabetes mellitus with hyperglycemia, without long-term current use of insulin ([address]    Essential hypertension    Acquired hypothyroidism    Mixed hyperlipidemia    Right upper quadrant abdominal pain    Goiter    Multinodular goiter    Stage 3a chronic kidney disease (HCC)    Anxiety    No known problems    Uncontrolled hypertension        Past Surgical History:   Procedure Laterality Date    APPENDECTOMY       SECTION      X3    CHOLECYSTECTOMY      COCCYX REMOVAL      WISDOM TOOTH EXTRACTION          Family History   Problem Relation Age of Onset    Heart Disease Father         Social History     Tobacco Use    Smoking status: Never    Smokeless tobacco: Never   Substance Use Topics    Alcohol use: Never    Drug use: Never        Objective  Vitals:    22 1335   BP: 132/86   Pulse: 84   Temp: 97.8 °F (36.6 °C)   SpO2: 98%   Weight: 184 lb (83.5 kg)   Height: 5' 6\" (1.676 m)        Exam:  Physical Exam  Vitals reviewed. Constitutional:       General: She is not in acute distress. Appearance: She is well-developed. She is obese. She is not ill-appearing. HENT:      Head: Normocephalic. Right Ear: External ear normal.      Left Ear: External ear normal.   Eyes:      General: No scleral icterus. Right eye: No discharge. Left eye: No discharge. Conjunctiva/sclera: Conjunctivae normal.      Pupils: Pupils are equal, round, and reactive to light. Neck:      Thyroid: No thyromegaly. Comments: Thyroid slightly enlarged and nodular  Cardiovascular:      Rate and Rhythm: Normal rate and regular rhythm. Heart sounds: Normal heart sounds. No murmur heard. No friction rub. No gallop. Pulmonary:      Effort: Pulmonary effort is normal. No respiratory distress. Breath sounds: Normal breath sounds. No wheezing or rales. Chest:      Chest wall: No tenderness. Abdominal:      General: Bowel sounds are normal. There is no distension. Palpations: Abdomen is soft. Tenderness: There is no guarding or rebound.    Musculoskeletal:         General: No tenderness or deformity. Normal range of motion. Cervical back: Normal range of motion and neck supple. Lymphadenopathy:      Cervical: No cervical adenopathy. Skin:     General: Skin is warm and dry. Coloration: Skin is not pale. Findings: No erythema or rash. Neurological:      Mental Status: She is alert and oriented to person, place, and time. Cranial Nerves: No cranial nerve deficit. Sensory: No sensory deficit. Deep Tendon Reflexes: Reflexes normal.   Psychiatric:         Mood and Affect: Mood normal.         Behavior: Behavior normal.         Thought Content: Thought content normal.         Judgment: Judgment normal.        Last labs reviewed. ASSESSMENT & PLAN :   Problem List    None       No follow-ups on file.   In March 2023      Kate Langley DO  12/20/2022

## 2022-12-20 NOTE — ASSESSMENT & PLAN NOTE
appears to be healing well with edges well approximated and no sign of infection such as redness and discharge. She is to keep the area clean and dry, cleanse it daily with soap and water, may apply bacitracin ointment lightly and cover with a bandage.   Do not soak the hand  Watch for any signs of infection such as increased warmth redness red streaks going up the arm if this happens let me know immediately

## 2022-12-20 NOTE — PROGRESS NOTES
2022    Name: Theresa Barkley : 1960 Sex: female  Age: 57 y.o. Subjective:  Chief Complaint   Patient presents with    ED Follow-up        HPI    Patient was seen in the ED at [facility] on 12/10/2022. She lacerated the dorsum of her right hand between the first and second digits which she cut herself with her mixer. Tdap is up-to-date. Reviewed report from ED. Laceration was cleaned and sutured with 8 sutures. This was a C-shaped laceration. She has been keeping it clean and wrapped. She has some pain along the incision site but she is able to move her hands and did explore the wound and noted that there was no extensor tendon involvement. Review of Systems   Constitutional:  Negative for fatigue. Respiratory:  Negative for chest tightness and shortness of breath. Cardiovascular:  Negative for chest pain, palpitations and leg swelling. Skin:  Positive for wound. Laceration right hand   Hematological:  Does not bruise/bleed easily. Current Outpatient Medications:     Semaglutide, 1 MG/DOSE, (OZEMPIC, 1 MG/DOSE,) 2 MG/1.5ML SOPN, Inject 1 mg into the skin once a week, Disp: 4 Adjustable Dose Pre-filled Pen Syringe, Rfl: 5    thyroid (ARMOUR THYROID) 90 MG tablet, Take 1 tablet by mouth daily, Disp: 30 tablet, Rfl: 11    telmisartan (MICARDIS) 80 MG tablet, Take 1 tablet by mouth daily, Disp: 30 tablet, Rfl: 11    metoprolol succinate (TOPROL XL) 100 MG extended release tablet, Take 1 tablet by mouth daily, Disp: 30 tablet, Rfl: 11    metFORMIN (GLUCOPHAGE-XR) 500 MG extended release tablet, TAKE TWO TABLETS BY MOUTH TWO TIMES A DAY, Disp: 120 tablet, Rfl: 5    blood glucose monitor strips, Test 1 times a day & as needed for symptoms of irregular blood glucose. Dispense sufficient amount for indicated testing frequency plus additional to accommodate PRN testing needs.  Contour, Disp: 100 strip, Rfl: 3    glipiZIDE (GLUCOTROL) 10 MG tablet, Take 1 tablet by mouth in the morning and 1 tablet before bedtime. , Disp: 60 tablet, Rfl: 3    pravastatin (PRAVACHOL) 20 MG tablet, Take 1 tablet by mouth daily, Disp: 30 tablet, Rfl: 5    glucose monitoring (FREESTYLE FREEDOM) kit, 1 kit by Does not apply route daily Contour brand, Disp: 1 kit, Rfl: 0    Multiple Vitamins-Minerals (MULTIVITAMIN GUMMIES ADULTS PO), Take by mouth, Disp: , Rfl:     Cholecalciferol (VITAMIN D3) 125 MCG (5000 UT) TABS, Take by mouth, Disp: , Rfl:     Biotin 10 MG tablet, Take 10 mg by mouth daily, Disp: , Rfl:     triamterene-hydroCHLOROthiazide (MAXZIDE) 75-50 MG per tablet, Take 1 tablet by mouth daily, Disp: 30 tablet, Rfl: 11    ibuprofen (ADVIL;MOTRIN) 800 MG tablet, TAKE ONE TABLET BY MOUTH EVERY 4 HOURS AS NEEDED, Disp: , Rfl:      No Known Allergies     Past Medical History:   Diagnosis Date    Acquired hypothyroidism 2019    Essential hypertension 2019    Mixed hyperlipidemia 2019    Type 2 diabetes mellitus with hyperglycemia, without long-term current use of insulin ([facility] 2019       Health Maintenance Due   Topic Date Due    Diabetic foot exam  Never done    Colorectal Cancer Screen  2018    Lipids  2023        Patient Active Problem List   Diagnosis    Type 2 diabetes mellitus with hyperglycemia, without long-term current use of insulin (HCC)    Essential hypertension    Acquired hypothyroidism    Mixed hyperlipidemia    Right upper quadrant abdominal pain    Goiter    Multinodular goiter    Stage 3a chronic kidney disease (HCC)    Anxiety    No known problems    Uncontrolled hypertension    Laceration of right hand without foreign body    Encounter for removal of sutures        Past Surgical History:   Procedure Laterality Date    APPENDECTOMY       SECTION      X3    CHOLECYSTECTOMY      COCCYX REMOVAL      WISDOM TOOTH EXTRACTION          Family History   Problem Relation Age of Onset    Heart Disease Father         Social History     Tobacco Use    Smoking status: Never    Smokeless tobacco: Never   Substance Use Topics    Alcohol use: Never    Drug use: Never        Objective  Vitals:    12/20/22 1335   BP: 132/86   Pulse: 84   Temp: 97.8 °F (36.6 °C)   SpO2: 98%   Weight: 184 lb (83.5 kg)   Height: 5' 6\" (1.676 m)        Exam:  Physical Exam  Vitals reviewed. Constitutional:       General: She is not in acute distress. Appearance: She is well-developed. She is obese. She is not ill-appearing. HENT:      Head: Normocephalic and atraumatic. Right Ear: External ear normal.      Left Ear: External ear normal.   Eyes:      General:         Right eye: No discharge. Left eye: No discharge. Cardiovascular:      Rate and Rhythm: Regular rhythm. Pulmonary:      Effort: Pulmonary effort is normal. No respiratory distress. Musculoskeletal:         General: Signs of injury present. No swelling or deformity. Cervical back: Normal range of motion and neck supple. Comments: Slightly decreased range of motion to flexion of her second digit right hand. Sensation distal to this laceration of the first and second digit were normal.   Skin:     General: Skin is warm and dry. Comments: 5cm C-shaped laceration on the dorsum of her right hand between the first and second digits. Edges were well approximated. There were 8 stitches which were removed without difficulty. Patient tolerated the procedure well. The wound was then bandaged and patient was told to keep it clean and dry and keep it covered until it heals. She is to wash it lightly with soap and water daily. Do not soak it   Neurological:      Mental Status: She is alert and oriented to person, place, and time. Psychiatric:         Behavior: Behavior normal.         Thought Content: Thought content normal.         Judgment: Judgment normal.        Last labs reviewed.       ASSESSMENT & PLAN :   Problem List          Endocrine    Type 2 diabetes mellitus with hyperglycemia, without long-term current use of insulin (HCC)       not at goal.  Prescription for Ozempic 1 mg subcu weekly sent to her pharmacy         Relevant Medications    glucose monitoring (FREESTYLE FREEDOM) kit    blood glucose monitor strips    glipiZIDE (GLUCOTROL) 10 MG tablet    metFORMIN (GLUCOPHAGE-XR) 500 MG extended release tablet    Semaglutide, 1 MG/DOSE, (OZEMPIC, 1 MG/DOSE,) 2 MG/1.5ML SOPN       Other    Laceration of right hand without foreign body - Primary       appears to be healing well with edges well approximated and no sign of infection such as redness and discharge. She is to keep the area clean and dry, cleanse it daily with soap and water, may apply bacitracin ointment lightly and cover with a bandage. Do not soak the hand  Watch for any signs of infection such as increased warmth redness red streaks going up the arm if this happens let me know immediately         Encounter for removal of sutures       8 interrupted sutures were removed without difficulty. Patient tolerated the procedure well             Return in about 1 week (around 12/27/2022), or if symptoms worsen or fail to improve.        Ramy Page, DO  12/20/2022

## 2023-02-08 DIAGNOSIS — E11.65 TYPE 2 DIABETES MELLITUS WITH HYPERGLYCEMIA, WITHOUT LONG-TERM CURRENT USE OF INSULIN (HCC): ICD-10-CM

## 2023-02-08 RX ORDER — GLIPIZIDE 10 MG/1
TABLET ORAL
Qty: 60 TABLET | Refills: 0 | Status: SHIPPED | OUTPATIENT
Start: 2023-02-08

## 2023-02-08 NOTE — TELEPHONE ENCOUNTER
Last Appointment:  12/20/2022  Future Appointments   Date Time Provider Sharon Owens   4/10/2023  1:00 PM [place] Kofi [facility]   4/10/2023  1:30 PM [place] Garcia [facility]

## 2023-05-14 DIAGNOSIS — E11.65 TYPE 2 DIABETES MELLITUS WITH HYPERGLYCEMIA, WITHOUT LONG-TERM CURRENT USE OF INSULIN (HCC): ICD-10-CM

## 2023-05-15 RX ORDER — SEMAGLUTIDE 1.34 MG/ML
INJECTION, SOLUTION SUBCUTANEOUS
Qty: 9 ML | Refills: 5 | Status: SHIPPED | OUTPATIENT
Start: 2023-05-15

## 2023-06-05 ENCOUNTER — TELEPHONE (OUTPATIENT)
Dept: PRIMARY CARE CLINIC | Age: 63
End: 2023-06-05

## 2023-06-05 ENCOUNTER — OFFICE VISIT (OUTPATIENT)
Dept: PRIMARY CARE CLINIC | Age: 63
End: 2023-06-05
Payer: COMMERCIAL

## 2023-06-05 VITALS
HEART RATE: 75 BPM | WEIGHT: 168 LBS | TEMPERATURE: 97.7 F | BODY MASS INDEX: 27 KG/M2 | HEIGHT: 66 IN | DIASTOLIC BLOOD PRESSURE: 98 MMHG | OXYGEN SATURATION: 99 % | SYSTOLIC BLOOD PRESSURE: 156 MMHG

## 2023-06-05 DIAGNOSIS — I10 ESSENTIAL HYPERTENSION: Primary | ICD-10-CM

## 2023-06-05 DIAGNOSIS — E03.9 ACQUIRED HYPOTHYROIDISM: ICD-10-CM

## 2023-06-05 DIAGNOSIS — E04.2 MULTINODULAR THYROID: ICD-10-CM

## 2023-06-05 DIAGNOSIS — E78.2 MIXED HYPERLIPIDEMIA: ICD-10-CM

## 2023-06-05 DIAGNOSIS — E11.65 TYPE 2 DIABETES MELLITUS WITH HYPERGLYCEMIA, WITHOUT LONG-TERM CURRENT USE OF INSULIN (HCC): ICD-10-CM

## 2023-06-05 DIAGNOSIS — Z12.31 BREAST CANCER SCREENING BY MAMMOGRAM: ICD-10-CM

## 2023-06-05 PROCEDURE — 3080F DIAST BP >= 90 MM HG: CPT | Performed by: INTERNAL MEDICINE

## 2023-06-05 PROCEDURE — 1123F ACP DISCUSS/DSCN MKR DOCD: CPT | Performed by: INTERNAL MEDICINE

## 2023-06-05 PROCEDURE — 3077F SYST BP >= 140 MM HG: CPT | Performed by: INTERNAL MEDICINE

## 2023-06-05 PROCEDURE — 99214 OFFICE O/P EST MOD 30 MIN: CPT | Performed by: INTERNAL MEDICINE

## 2023-06-05 RX ORDER — GLIPIZIDE 10 MG/1
TABLET ORAL
Qty: 180 TABLET | Refills: 1 | Status: SHIPPED | OUTPATIENT
Start: 2023-06-05

## 2023-06-05 RX ORDER — LEVOTHYROXINE AND LIOTHYRONINE 57; 13.5 UG/1; UG/1
90 TABLET ORAL DAILY
Qty: 30 TABLET | Refills: 11 | Status: SHIPPED | OUTPATIENT
Start: 2023-06-05

## 2023-06-05 SDOH — ECONOMIC STABILITY: FOOD INSECURITY: WITHIN THE PAST 12 MONTHS, THE FOOD YOU BOUGHT JUST DIDN'T LAST AND YOU DIDN'T HAVE MONEY TO GET MORE.: NEVER TRUE

## 2023-06-05 SDOH — ECONOMIC STABILITY: HOUSING INSECURITY
IN THE LAST 12 MONTHS, WAS THERE A TIME WHEN YOU DID NOT HAVE A STEADY PLACE TO SLEEP OR SLEPT IN A SHELTER (INCLUDING NOW)?: NO

## 2023-06-05 SDOH — ECONOMIC STABILITY: FOOD INSECURITY: WITHIN THE PAST 12 MONTHS, YOU WORRIED THAT YOUR FOOD WOULD RUN OUT BEFORE YOU GOT MONEY TO BUY MORE.: NEVER TRUE

## 2023-06-05 SDOH — ECONOMIC STABILITY: INCOME INSECURITY: HOW HARD IS IT FOR YOU TO PAY FOR THE VERY BASICS LIKE FOOD, HOUSING, MEDICAL CARE, AND HEATING?: NOT HARD AT ALL

## 2023-06-05 ASSESSMENT — ENCOUNTER SYMPTOMS
SORE THROAT: 0
EYE ITCHING: 0
WHEEZING: 0
STRIDOR: 0
TROUBLE SWALLOWING: 0
NAUSEA: 0
EYE DISCHARGE: 0
DIARRHEA: 0
SHORTNESS OF BREATH: 0
EYE PAIN: 0
FACIAL SWELLING: 0
RHINORRHEA: 0
ANAL BLEEDING: 0
COUGH: 0
COLOR CHANGE: 0
BLOOD IN STOOL: 0
ABDOMINAL PAIN: 0
PHOTOPHOBIA: 0
VOMITING: 0
CONSTIPATION: 0

## 2023-06-05 ASSESSMENT — PATIENT HEALTH QUESTIONNAIRE - PHQ9
1. LITTLE INTEREST OR PLEASURE IN DOING THINGS: 0
SUM OF ALL RESPONSES TO PHQ9 QUESTIONS 1 & 2: 0
SUM OF ALL RESPONSES TO PHQ QUESTIONS 1-9: 0
2. FEELING DOWN, DEPRESSED OR HOPELESS: 0
SUM OF ALL RESPONSES TO PHQ QUESTIONS 1-9: 0

## 2023-06-05 NOTE — ASSESSMENT & PLAN NOTE
not at goal.  Unable to get hemoglobin A1c until she becomes Medicare age because of cost.  In the meantime watch her diet, continue with weight loss, continue metformin, Ozempic and glipizide  Check hemoglobin A1c and urine microalbumin creatinine ratio. She  gets her retinal examination with Dr. Kameron Duarte.   There was some abnormality in the examination he wanted her to get some blood work and ultrasound of her carotids but she was unable to do so because of cost.  We will get a copy of his report

## 2023-06-05 NOTE — PROGRESS NOTES
frequency plus additional to accommodate PRN testing needs.  Contour, Disp: 100 strip, Rfl: 3    pravastatin (PRAVACHOL) 20 MG tablet, Take 1 tablet by mouth daily, Disp: 30 tablet, Rfl: 5    glucose monitoring (FREESTYLE FREEDOM) kit, 1 kit by Does not apply route daily Contour brand, Disp: 1 kit, Rfl: 0    Multiple Vitamins-Minerals (MULTIVITAMIN GUMMIES ADULTS PO), Take by mouth, Disp: , Rfl:     Cholecalciferol (VITAMIN D3) 125 MCG (5000 UT) TABS, Take by mouth, Disp: , Rfl:     Biotin 10 MG tablet, Take 1 tablet by mouth daily, Disp: , Rfl:     triamterene-hydroCHLOROthiazide (MAXZIDE) 75-50 MG per tablet, Take 1 tablet by mouth daily, Disp: 30 tablet, Rfl: 11    ibuprofen (ADVIL;MOTRIN) 800 MG tablet, TAKE ONE TABLET BY MOUTH EVERY 4 HOURS AS NEEDED, Disp: , Rfl:      No Known Allergies     Past Medical History:   Diagnosis Date    Acquired hypothyroidism 11/14/2019    Essential hypertension 11/14/2019    Mixed hyperlipidemia 11/14/2019    Type 2 diabetes mellitus with hyperglycemia, without long-term current use of insulin ([facility] 11/14/2019       Health Maintenance Due   Topic Date Due    Diabetic foot exam  Never done    DEXA (modify frequency per FRAX score)  Never done    Colorectal Cancer Screen  11/04/2018    Diabetic Alb to Cr ratio (uACR) test  01/03/2023    Lipids  01/03/2023    GFR test (Diabetes, CKD 3-4, OR last GFR 15-59)  01/03/2023    A1C test (Diabetic or Prediabetic)  03/05/2023    Breast cancer screen  05/20/2023        Patient Active Problem List   Diagnosis    Type 2 diabetes mellitus with hyperglycemia, without long-term current use of insulin (HCC)    Primary hypertension    Acquired hypothyroidism    Mixed hyperlipidemia    Right upper quadrant abdominal pain    Goiter    Multinodular thyroid    Stage 3a chronic kidney disease (HCC)    Anxiety    No known problems    Uncontrolled hypertension    Laceration of right hand without foreign body    Encounter for removal of sutures    Breast

## 2023-06-05 NOTE — ASSESSMENT & PLAN NOTE
check TSH and free T4 prescription for [place] Thyroid given 80 mg a day. She has been tried on d.a.w. Synthroid, levothyroxine, Cytomel and none of these have worked as well as [place] Thyroid.   Prior authorization will be made

## 2023-06-05 NOTE — PATIENT INSTRUCTIONS
Monitor blood pressures target 120/80  Get mammogram and thyroid ultrasound at [address]  When you get your Medicare card have fasting lab work and Imaging studies done

## 2023-06-05 NOTE — ASSESSMENT & PLAN NOTE
not at goal.  Patient says blood pressures are much better at home. Told her her goal was 120/80. Continue her medications and monitor her blood pressures and bring her report when she comes back if not at goal that time we will have to adjust medications. Check a CMP.   All blood work will be done when she gets her [facility] Malave

## 2023-11-01 DIAGNOSIS — E11.65 TYPE 2 DIABETES MELLITUS WITH HYPERGLYCEMIA, WITHOUT LONG-TERM CURRENT USE OF INSULIN (HCC): ICD-10-CM

## 2023-11-01 RX ORDER — GLIPIZIDE 10 MG/1
TABLET ORAL
Qty: 180 TABLET | Refills: 1 | Status: SHIPPED | OUTPATIENT
Start: 2023-11-01

## 2023-12-26 RX ORDER — METOPROLOL SUCCINATE 100 MG/1
100 TABLET, EXTENDED RELEASE ORAL DAILY
Qty: 30 TABLET | Refills: 0 | Status: SHIPPED | OUTPATIENT
Start: 2023-12-26